# Patient Record
Sex: FEMALE | Race: WHITE | NOT HISPANIC OR LATINO | Employment: OTHER | URBAN - METROPOLITAN AREA
[De-identification: names, ages, dates, MRNs, and addresses within clinical notes are randomized per-mention and may not be internally consistent; named-entity substitution may affect disease eponyms.]

---

## 2019-09-12 ENCOUNTER — OFFICE VISIT (OUTPATIENT)
Dept: URGENT CARE | Age: 69
End: 2019-09-12
Payer: COMMERCIAL

## 2019-09-12 VITALS
HEIGHT: 68 IN | WEIGHT: 187 LBS | DIASTOLIC BLOOD PRESSURE: 74 MMHG | HEART RATE: 120 BPM | BODY MASS INDEX: 28.34 KG/M2 | RESPIRATION RATE: 18 BRPM | OXYGEN SATURATION: 93 % | TEMPERATURE: 98 F | SYSTOLIC BLOOD PRESSURE: 144 MMHG

## 2019-09-12 DIAGNOSIS — R10.13 EPIGASTRIC PAIN: ICD-10-CM

## 2019-09-12 DIAGNOSIS — R11.0 NAUSEA: ICD-10-CM

## 2019-09-12 DIAGNOSIS — R63.5 WEIGHT GAIN: ICD-10-CM

## 2019-09-12 DIAGNOSIS — M79.89 LEG SWELLING: ICD-10-CM

## 2019-09-12 DIAGNOSIS — R06.02 SHORTNESS OF BREATH: Primary | ICD-10-CM

## 2019-09-12 DIAGNOSIS — R07.89 ATYPICAL CHEST PAIN: ICD-10-CM

## 2019-09-12 LAB
ATRIAL RATE: 121 BPM
P AXIS: 73 DEGREES
PR INTERVAL: 160 MS
QRS AXIS: 87 DEGREES
QRSD INTERVAL: 102 MS
QT INTERVAL: 316 MS
QTC INTERVAL: 448 MS
T WAVE AXIS: -52 DEGREES
VENTRICULAR RATE: 121 BPM

## 2019-09-12 PROCEDURE — 93005 ELECTROCARDIOGRAM TRACING: CPT | Performed by: PHYSICIAN ASSISTANT

## 2019-09-12 PROCEDURE — G0383 LEV 4 HOSP TYPE B ED VISIT: HCPCS | Performed by: PHYSICIAN ASSISTANT

## 2019-09-12 PROCEDURE — 93010 ELECTROCARDIOGRAM REPORT: CPT | Performed by: INTERNAL MEDICINE

## 2019-09-12 RX ORDER — ASPIRIN 81 MG/1
324 TABLET, CHEWABLE ORAL DAILY
Status: DISCONTINUED | OUTPATIENT
Start: 2019-09-12 | End: 2019-09-12

## 2019-09-12 RX ORDER — ASPIRIN 81 MG/1
324 TABLET, CHEWABLE ORAL DAILY
Status: COMPLETED | OUTPATIENT
Start: 2019-09-12 | End: 2019-09-12

## 2019-09-12 RX ADMIN — ASPIRIN 324 MG: 81 TABLET, CHEWABLE ORAL at 08:06

## 2019-09-12 NOTE — PROGRESS NOTES
[address] [facility]        NAME: Star Riddle is a 77 y o  female  : 1950    MRN: [id number]  DATE: 2019  TIME: 8:08 AM    Assessment and Plan   Shortness of breath [R06 02]  1  Shortness of breath  ECG 12 lead    Transfer to other facility    CANCELED: Transfer to other facility   2  Epigastric pain  ECG 12 lead    aspirin chewable tablet 324 mg    Transfer to other facility    DISCONTINUED: aspirin chewable tablet 324 mg    CANCELED: Transfer to other facility   3  Nausea  ECG 12 lead    aspirin chewable tablet 324 mg    Transfer to other facility    DISCONTINUED: aspirin chewable tablet 324 mg    CANCELED: Transfer to other facility   4  Weight gain  ECG 12 lead    Transfer to other facility    CANCELED: Transfer to other facility   5  Leg swelling  ECG 12 lead    Transfer to other facility    CANCELED: Transfer to other facility   6  Atypical chest pain  Transfer to other facility     EKG- ST changes/abnormalities, tachycardia- will give ASA at this time due to atypical chest pain and EKG  Patient changed her mind when ambulance arrived and she would like to go to [facility]    Patient Instructions     Ambulance was called immediately, patient would like to go to [facility] ER    Chief Complaint     Chief Complaint   Patient presents with    Shortness of Breath     x 4 days, with intermittent dry cough, when laying c/o wheezing , a little edema to ankles, c/o weight gain of 7lbs x week   Abdominal Pain     epigastric discomfort since labor day, w/diarrhea, denies n/v         History of Present Illness       52-year-old female who denies any past medical history presents with shortness of breath, worse with lying down, weight gain up 5-7 lb in last week, bilateral leg swelling, nausea and epigastric pain worsening over the past 4 days  Patient states she was trying Nexium for the "acid reflux" feeling with no relief    She has not taken any aspirin or other medications for the symptoms  She notes since Labor Day she was having some diarrhea and generalized abdominal pain with decreased appetite and fluid intake  States the diarrhea and generalized abdominal pain has resolved but then the epigastric pain and the shortness of breath started  She denies chest pain or palpitations  No radiation of pain down her arms  Denies any prior abdominal surgeries  Review of Systems   Review of Systems   Constitutional: Positive for activity change, appetite change and fatigue  Negative for chills and fever  Eyes: Negative for visual disturbance  Respiratory: Positive for shortness of breath  Negative for cough and chest tightness  Cardiovascular: Positive for leg swelling  Negative for chest pain and palpitations  Gastrointestinal: Positive for abdominal pain and nausea  Negative for diarrhea and vomiting  Musculoskeletal: Negative for back pain  Neurological: Negative for dizziness, numbness and headaches  All other systems reviewed and are negative  Current Medications       Current Outpatient Medications:     esomeprazole (NexIUM) 20 mg capsule, Take 20 mg by mouth every morning before breakfast, Disp: , Rfl:   No current facility-administered medications for this visit  Current Allergies     Allergies as of 09/12/2019    (No Known Allergies)            The following portions of the patient's history were reviewed and updated as appropriate: allergies, current medications, past family history, past medical history, past social history, past surgical history and problem list      History reviewed  No pertinent past medical history  History reviewed  No pertinent surgical history  No family history on file  Medications have been verified          Objective   /74   Pulse (!) 120   Temp 98 °F (36 7 °C)   Resp 18   Ht 5' 8" (1 727 m)   Wt 84 8 kg (187 lb)   SpO2 93%   BMI 28 43 kg/m²        Physical Exam     Physical Exam Constitutional: She is oriented to person, place, and time  She appears well-developed and well-nourished  Cardiovascular: Regular rhythm  Tachycardia present  Pulmonary/Chest: Effort normal and breath sounds normal  She has no decreased breath sounds  She has no wheezes  Abdominal: Soft  Bowel sounds are normal  There is tenderness (epigastric)  Neurological: She is alert and oriented to person, place, and time  Psychiatric: She has a normal mood and affect  Her behavior is normal    Nursing note and vitals reviewed

## 2021-01-01 ENCOUNTER — APPOINTMENT (INPATIENT)
Dept: RADIOLOGY | Facility: HOSPITAL | Age: 71
DRG: 309 | End: 2021-01-01
Payer: COMMERCIAL

## 2021-01-01 ENCOUNTER — HOSPITAL ENCOUNTER (INPATIENT)
Facility: HOSPITAL | Age: 71
LOS: 4 days | Discharge: HOME | DRG: 309 | End: 2021-05-20
Attending: SURGERY | Admitting: ANESTHESIOLOGY
Payer: COMMERCIAL

## 2021-01-01 ENCOUNTER — APPOINTMENT (EMERGENCY)
Dept: RADIOLOGY | Facility: HOSPITAL | Age: 71
DRG: 309 | End: 2021-01-01
Payer: COMMERCIAL

## 2021-01-01 ENCOUNTER — APPOINTMENT (INPATIENT)
Dept: NON INVASIVE DIAGNOSTICS | Facility: HOSPITAL | Age: 71
DRG: 309 | End: 2021-01-01
Payer: COMMERCIAL

## 2021-01-01 VITALS
BODY MASS INDEX: 22.88 KG/M2 | HEIGHT: 67 IN | WEIGHT: 145.8 LBS | TEMPERATURE: 97.9 F | DIASTOLIC BLOOD PRESSURE: 53 MMHG | OXYGEN SATURATION: 97 % | HEART RATE: 57 BPM | SYSTOLIC BLOOD PRESSURE: 115 MMHG | RESPIRATION RATE: 19 BRPM

## 2021-01-01 DIAGNOSIS — I47.2 VENTRICULAR TACHYCARDIA (HCC): ICD-10-CM

## 2021-01-01 DIAGNOSIS — Z23 ENCOUNTER FOR IMMUNIZATION: ICD-10-CM

## 2021-01-01 DIAGNOSIS — R55 SYNCOPE: ICD-10-CM
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
## 2021-01-01 LAB
ALBUMIN SERPL BCP-MCNC: 2.8 G/DL (ref 3.5–5)
ALBUMIN SERPL BCP-MCNC: 2.9 G/DL (ref 3.5–5)
ALBUMIN SERPL BCP-MCNC: 3.3 G/DL (ref 3.5–5)
ALP SERPL-CCNC: 68 U/L (ref 46–116)
ALP SERPL-CCNC: 69 U/L (ref 46–116)
ALP SERPL-CCNC: 89 U/L (ref 46–116)
ALT SERPL W P-5'-P-CCNC: 108 U/L (ref 12–78)
ALT SERPL W P-5'-P-CCNC: 142 U/L (ref 12–78)
ALT SERPL W P-5'-P-CCNC: 87 U/L (ref 12–78)
ANION GAP SERPL CALCULATED.3IONS-SCNC: 10 MMOL/L (ref 4–13)
ANION GAP SERPL CALCULATED.3IONS-SCNC: 11 MMOL/L (ref 4–13)
ANION GAP SERPL CALCULATED.3IONS-SCNC: 14 MMOL/L (ref 4–13)
ANION GAP SERPL CALCULATED.3IONS-SCNC: 6 MMOL/L (ref 4–13)
ANION GAP SERPL CALCULATED.3IONS-SCNC: 7 MMOL/L (ref 4–13)
ANION GAP SERPL CALCULATED.3IONS-SCNC: 7 MMOL/L (ref 4–13)
ANION GAP SERPL CALCULATED.3IONS-SCNC: 9 MMOL/L (ref 4–13)
AST SERPL W P-5'-P-CCNC: 103 U/L (ref 5–45)
AST SERPL W P-5'-P-CCNC: 103 U/L (ref 5–45)
AST SERPL W P-5'-P-CCNC: 62 U/L (ref 5–45)
ATRIAL RATE: 49 BPM
ATRIAL RATE: 56 BPM
ATRIAL RATE: 65 BPM
ATRIAL RATE: 66 BPM
ATRIAL RATE: 80 BPM
ATRIAL RATE: 82 BPM
ATRIAL RATE: 98 BPM
BASE EX.OXY STD BLDV CALC-SCNC: 68.1 % (ref 60–80)
BASE EXCESS BLDA CALC-SCNC: -11.5 MMOL/L
BASE EXCESS BLDA CALC-SCNC: -3.3 MMOL/L
BASE EXCESS BLDA CALC-SCNC: -3.9 MMOL/L
BASE EXCESS BLDA CALC-SCNC: -4.2 MMOL/L
BASE EXCESS BLDA CALC-SCNC: -5 MMOL/L (ref -2–3)
BASE EXCESS BLDA CALC-SCNC: -9 MMOL/L (ref -2–3)
BASE EXCESS BLDV CALC-SCNC: -1.8 MMOL/L
BASOPHILS # BLD AUTO: 0.05 THOUSANDS/ΜL (ref 0–0.1)
BASOPHILS # BLD AUTO: 0.05 THOUSANDS/ΜL (ref 0–0.1)
BASOPHILS # BLD AUTO: 0.06 THOUSANDS/ΜL (ref 0–0.1)
BASOPHILS # BLD AUTO: 0.06 THOUSANDS/ΜL (ref 0–0.1)
BASOPHILS # BLD AUTO: 0.08 THOUSANDS/ΜL (ref 0–0.1)
BASOPHILS NFR BLD AUTO: 0 % (ref 0–1)
BASOPHILS NFR BLD AUTO: 1 % (ref 0–1)
BASOPHILS NFR BLD AUTO: 1 % (ref 0–1)
BILIRUB DIRECT SERPL-MCNC: 0.15 MG/DL (ref 0–0.2)
BILIRUB DIRECT SERPL-MCNC: 0.24 MG/DL (ref 0–0.2)
BILIRUB SERPL-MCNC: 0.54 MG/DL (ref 0.2–1)
BILIRUB SERPL-MCNC: 0.75 MG/DL (ref 0.2–1)
BILIRUB SERPL-MCNC: 1 MG/DL (ref 0.2–1)
BODY TEMPERATURE: 97.5 DEGREES FEHRENHEIT
BODY TEMPERATURE: 97.7 DEGREES FEHRENHEIT
BUN SERPL-MCNC: 20 MG/DL (ref 5–25)
BUN SERPL-MCNC: 24 MG/DL (ref 5–25)
BUN SERPL-MCNC: 27 MG/DL (ref 5–25)
BUN SERPL-MCNC: 37 MG/DL (ref 5–25)
BUN SERPL-MCNC: 38 MG/DL (ref 5–25)
BUN SERPL-MCNC: 38 MG/DL (ref 5–25)
BUN SERPL-MCNC: 42 MG/DL (ref 5–25)
CA-I BLD-SCNC: 1.18 MMOL/L (ref 1.12–1.32)
CA-I BLD-SCNC: 1.3 MMOL/L (ref 1.12–1.32)
CA-I BLD-SCNC: 1.45 MMOL/L (ref 1.12–1.32)
CALCIUM ALBUM COR SERPL-MCNC: 10.9 MG/DL (ref 8.3–10.1)
CALCIUM SERPL-MCNC: 10 MG/DL (ref 8.3–10.1)
CALCIUM SERPL-MCNC: 10.2 MG/DL (ref 8.3–10.1)
CALCIUM SERPL-MCNC: 10.3 MG/DL (ref 8.3–10.1)
CALCIUM SERPL-MCNC: 11.1 MG/DL (ref 8.3–10.1)
CALCIUM SERPL-MCNC: 9 MG/DL (ref 8.3–10.1)
CALCIUM SERPL-MCNC: 9.1 MG/DL (ref 8.3–10.1)
CALCIUM SERPL-MCNC: 9.8 MG/DL (ref 8.3–10.1)
CHLORIDE SERPL-SCNC: 104 MMOL/L (ref 100–108)
CHLORIDE SERPL-SCNC: 106 MMOL/L (ref 100–108)
CHLORIDE SERPL-SCNC: 107 MMOL/L (ref 100–108)
CHLORIDE SERPL-SCNC: 109 MMOL/L (ref 100–108)
CHLORIDE SERPL-SCNC: 110 MMOL/L (ref 100–108)
CHLORIDE SERPL-SCNC: 110 MMOL/L (ref 100–108)
CHLORIDE SERPL-SCNC: 111 MMOL/L (ref 100–108)
CO2 SERPL-SCNC: 17 MMOL/L (ref 21–32)
CO2 SERPL-SCNC: 19 MMOL/L (ref 21–32)
CO2 SERPL-SCNC: 20 MMOL/L (ref 21–32)
CO2 SERPL-SCNC: 20 MMOL/L (ref 21–32)
CO2 SERPL-SCNC: 21 MMOL/L (ref 21–32)
CO2 SERPL-SCNC: 21 MMOL/L (ref 21–32)
CO2 SERPL-SCNC: 24 MMOL/L (ref 21–32)
CREAT SERPL-MCNC: 1.05 MG/DL (ref 0.6–1.3)
CREAT SERPL-MCNC: 1.05 MG/DL (ref 0.6–1.3)
CREAT SERPL-MCNC: 1.11 MG/DL (ref 0.6–1.3)
CREAT SERPL-MCNC: 1.44 MG/DL (ref 0.6–1.3)
CREAT SERPL-MCNC: 1.48 MG/DL (ref 0.6–1.3)
CREAT SERPL-MCNC: 1.53 MG/DL (ref 0.6–1.3)
CREAT SERPL-MCNC: 1.83 MG/DL (ref 0.6–1.3)
EOSINOPHIL # BLD AUTO: 0 THOUSAND/ΜL (ref 0–0.61)
EOSINOPHIL # BLD AUTO: 0.05 THOUSAND/ΜL (ref 0–0.61)
EOSINOPHIL # BLD AUTO: 0.06 THOUSAND/ΜL (ref 0–0.61)
EOSINOPHIL # BLD AUTO: 0.15 THOUSAND/ΜL (ref 0–0.61)
EOSINOPHIL # BLD AUTO: 0.22 THOUSAND/ΜL (ref 0–0.61)
EOSINOPHIL NFR BLD AUTO: 0 % (ref 0–6)
EOSINOPHIL NFR BLD AUTO: 2 % (ref 0–6)
EOSINOPHIL NFR BLD AUTO: 2 % (ref 0–6)
ERYTHROCYTE [DISTWIDTH] IN BLOOD BY AUTOMATED COUNT: 14.6 % (ref 11.6–15.1)
ERYTHROCYTE [DISTWIDTH] IN BLOOD BY AUTOMATED COUNT: 14.6 % (ref 11.6–15.1)
ERYTHROCYTE [DISTWIDTH] IN BLOOD BY AUTOMATED COUNT: 14.9 % (ref 11.6–15.1)
ERYTHROCYTE [DISTWIDTH] IN BLOOD BY AUTOMATED COUNT: 15 % (ref 11.6–15.1)
EST. AVERAGE GLUCOSE BLD GHB EST-MCNC: 131 MG/DL
GFR SERPL CREATININE-BSD FRML MDRD: 28 ML/MIN/1.73SQ M
GFR SERPL CREATININE-BSD FRML MDRD: 34 ML/MIN/1.73SQ M
GFR SERPL CREATININE-BSD FRML MDRD: 36 ML/MIN/1.73SQ M
GFR SERPL CREATININE-BSD FRML MDRD: 37 ML/MIN/1.73SQ M
GFR SERPL CREATININE-BSD FRML MDRD: 50 ML/MIN/1.73SQ M
GFR SERPL CREATININE-BSD FRML MDRD: 54 ML/MIN/1.73SQ M
GFR SERPL CREATININE-BSD FRML MDRD: 54 ML/MIN/1.73SQ M
GLUCOSE SERPL-MCNC: 105 MG/DL (ref 65–140)
GLUCOSE SERPL-MCNC: 120 MG/DL (ref 65–140)
GLUCOSE SERPL-MCNC: 122 MG/DL (ref 65–140)
GLUCOSE SERPL-MCNC: 124 MG/DL (ref 65–140)
GLUCOSE SERPL-MCNC: 125 MG/DL (ref 65–140)
GLUCOSE SERPL-MCNC: 128 MG/DL (ref 65–140)
GLUCOSE SERPL-MCNC: 130 MG/DL (ref 65–140)
GLUCOSE SERPL-MCNC: 132 MG/DL (ref 65–140)
GLUCOSE SERPL-MCNC: 133 MG/DL (ref 65–140)
GLUCOSE SERPL-MCNC: 137 MG/DL (ref 65–140)
GLUCOSE SERPL-MCNC: 137 MG/DL (ref 65–140)
GLUCOSE SERPL-MCNC: 143 MG/DL (ref 65–140)
GLUCOSE SERPL-MCNC: 146 MG/DL (ref 65–140)
GLUCOSE SERPL-MCNC: 148 MG/DL (ref 65–140)
GLUCOSE SERPL-MCNC: 149 MG/DL (ref 65–140)
GLUCOSE SERPL-MCNC: 151 MG/DL (ref 65–140)
GLUCOSE SERPL-MCNC: 153 MG/DL (ref 65–140)
GLUCOSE SERPL-MCNC: 158 MG/DL (ref 65–140)
GLUCOSE SERPL-MCNC: 168 MG/DL (ref 65–140)
GLUCOSE SERPL-MCNC: 171 MG/DL (ref 65–140)
GLUCOSE SERPL-MCNC: 178 MG/DL (ref 65–140)
GLUCOSE SERPL-MCNC: 186 MG/DL (ref 65–140)
GLUCOSE SERPL-MCNC: 192 MG/DL (ref 65–140)
GLUCOSE SERPL-MCNC: 215 MG/DL (ref 65–140)
GLUCOSE SERPL-MCNC: 295 MG/DL (ref 65–140)
HBA1C MFR BLD: 6.2 %
HCO3 BLDA-SCNC: 12.1 MMOL/L (ref 22–28)
HCO3 BLDA-SCNC: 17.6 MMOL/L (ref 22–28)
HCO3 BLDA-SCNC: 17.9 MMOL/L (ref 24–30)
HCO3 BLDA-SCNC: 18.1 MMOL/L (ref 22–28)
HCO3 BLDA-SCNC: 18.3 MMOL/L (ref 22–28)
HCO3 BLDA-SCNC: 18.4 MMOL/L (ref 24–30)
HCO3 BLDV-SCNC: 21.7 MMOL/L (ref 24–30)
HCT VFR BLD AUTO: 33.9 % (ref 34.8–46.1)
HCT VFR BLD AUTO: 34.6 % (ref 34.8–46.1)
HCT VFR BLD AUTO: 37.3 % (ref 34.8–46.1)
HCT VFR BLD AUTO: 38.2 % (ref 34.8–46.1)
HCT VFR BLD AUTO: 39.4 % (ref 34.8–46.1)
HCT VFR BLD AUTO: 42.6 % (ref 34.8–46.1)
HCT VFR BLD CALC: 38 % (ref 34.8–46.1)
HCT VFR BLD CALC: 44 % (ref 34.8–46.1)
HGB BLD-MCNC: 10.7 G/DL (ref 11.5–15.4)
HGB BLD-MCNC: 10.9 G/DL (ref 11.5–15.4)
HGB BLD-MCNC: 11.9 G/DL (ref 11.5–15.4)
HGB BLD-MCNC: 12.1 G/DL (ref 11.5–15.4)
HGB BLD-MCNC: 12.5 G/DL (ref 11.5–15.4)
HGB BLD-MCNC: 13.6 G/DL (ref 11.5–15.4)
HGB BLDA-MCNC: 12.9 G/DL (ref 11.5–15.4)
HGB BLDA-MCNC: 15 G/DL (ref 11.5–15.4)
HOLD SPECIMEN: NORMAL
HOROWITZ INDEX BLDA+IHG-RTO: 50 MM[HG]
HOROWITZ INDEX BLDA+IHG-RTO: 80 MM[HG]
IMM GRANULOCYTES # BLD AUTO: 0.03 THOUSAND/UL (ref 0–0.2)
IMM GRANULOCYTES # BLD AUTO: 0.03 THOUSAND/UL (ref 0–0.2)
IMM GRANULOCYTES # BLD AUTO: 0.04 THOUSAND/UL (ref 0–0.2)
IMM GRANULOCYTES # BLD AUTO: 0.06 THOUSAND/UL (ref 0–0.2)
IMM GRANULOCYTES # BLD AUTO: 0.08 THOUSAND/UL (ref 0–0.2)
IMM GRANULOCYTES NFR BLD AUTO: 0 % (ref 0–2)
IMM GRANULOCYTES NFR BLD AUTO: 1 % (ref 0–2)
INR PPP: 1.06 (ref 0.84–1.19)
LACTATE SERPL-SCNC: 2 MMOL/L (ref 0.5–2)
LACTATE SERPL-SCNC: 2.2 MMOL/L (ref 0.5–2)
LIDOCAIN SERPL-MCNC: 3.5 UG/ML (ref 1.5–5)
LYMPHOCYTES # BLD AUTO: 1.15 THOUSANDS/ΜL (ref 0.6–4.47)
LYMPHOCYTES # BLD AUTO: 1.7 THOUSANDS/ΜL (ref 0.6–4.47)
LYMPHOCYTES # BLD AUTO: 1.74 THOUSANDS/ΜL (ref 0.6–4.47)
LYMPHOCYTES # BLD AUTO: 2.17 THOUSANDS/ΜL (ref 0.6–4.47)
LYMPHOCYTES # BLD AUTO: 4.84 THOUSANDS/ΜL (ref 0.6–4.47)
LYMPHOCYTES NFR BLD AUTO: 13 % (ref 14–44)
LYMPHOCYTES NFR BLD AUTO: 14 % (ref 14–44)
LYMPHOCYTES NFR BLD AUTO: 19 % (ref 14–44)
LYMPHOCYTES NFR BLD AUTO: 34 % (ref 14–44)
LYMPHOCYTES NFR BLD AUTO: 8 % (ref 14–44)
MAGNESIUM SERPL-MCNC: 1.7 MG/DL (ref 1.6–2.6)
MAGNESIUM SERPL-MCNC: 2.4 MG/DL (ref 1.6–2.6)
MAGNESIUM SERPL-MCNC: 2.6 MG/DL (ref 1.6–2.6)
MAGNESIUM SERPL-MCNC: 2.8 MG/DL (ref 1.6–2.6)
MAGNESIUM SERPL-MCNC: 4.1 MG/DL (ref 1.6–2.6)
MAGNESIUM SERPL-MCNC: 5.4 MG/DL (ref 1.6–2.6)
MCH RBC QN AUTO: 26.6 PG (ref 26.8–34.3)
MCH RBC QN AUTO: 27 PG (ref 26.8–34.3)
MCH RBC QN AUTO: 27.1 PG (ref 26.8–34.3)
MCH RBC QN AUTO: 27.2 PG (ref 26.8–34.3)
MCHC RBC AUTO-ENTMCNC: 30.9 G/DL (ref 31.4–37.4)
MCHC RBC AUTO-ENTMCNC: 31.2 G/DL (ref 31.4–37.4)
MCHC RBC AUTO-ENTMCNC: 31.7 G/DL (ref 31.4–37.4)
MCHC RBC AUTO-ENTMCNC: 31.9 G/DL (ref 31.4–37.4)
MCHC RBC AUTO-ENTMCNC: 32.2 G/DL (ref 31.4–37.4)
MCHC RBC AUTO-ENTMCNC: 32.4 G/DL (ref 31.4–37.4)
MCV RBC AUTO: 83 FL (ref 82–98)
MCV RBC AUTO: 84 FL (ref 82–98)
MCV RBC AUTO: 85 FL (ref 82–98)
MCV RBC AUTO: 86 FL (ref 82–98)
MCV RBC AUTO: 86 FL (ref 82–98)
MCV RBC AUTO: 87 FL (ref 82–98)
MONOCYTES # BLD AUTO: 0.55 THOUSAND/ΜL (ref 0.17–1.22)
MONOCYTES # BLD AUTO: 0.73 THOUSAND/ΜL (ref 0.17–1.22)
MONOCYTES # BLD AUTO: 0.78 THOUSAND/ΜL (ref 0.17–1.22)
MONOCYTES # BLD AUTO: 0.86 THOUSAND/ΜL (ref 0.17–1.22)
MONOCYTES # BLD AUTO: 0.93 THOUSAND/ΜL (ref 0.17–1.22)
MONOCYTES NFR BLD AUTO: 5 % (ref 4–12)
MONOCYTES NFR BLD AUTO: 6 % (ref 4–12)
NEUTROPHILS # BLD AUTO: 10.84 THOUSANDS/ΜL (ref 1.85–7.62)
NEUTROPHILS # BLD AUTO: 12.92 THOUSANDS/ΜL (ref 1.85–7.62)
NEUTROPHILS # BLD AUTO: 12.93 THOUSANDS/ΜL (ref 1.85–7.62)
NEUTROPHILS # BLD AUTO: 6.72 THOUSANDS/ΜL (ref 1.85–7.62)
NEUTROPHILS # BLD AUTO: 8.39 THOUSANDS/ΜL (ref 1.85–7.62)
NEUTS SEG NFR BLD AUTO: 58 % (ref 43–75)
NEUTS SEG NFR BLD AUTO: 72 % (ref 43–75)
NEUTS SEG NFR BLD AUTO: 80 % (ref 43–75)
NEUTS SEG NFR BLD AUTO: 81 % (ref 43–75)
NEUTS SEG NFR BLD AUTO: 85 % (ref 43–75)
NRBC BLD AUTO-RTO: 0 /100 WBCS
O2 CT BLDA-SCNC: 18.1 ML/DL (ref 16–23)
O2 CT BLDA-SCNC: 18.1 ML/DL (ref 16–23)
O2 CT BLDA-SCNC: 18.6 ML/DL (ref 16–23)
O2 CT BLDA-SCNC: 18.8 ML/DL (ref 16–23)
O2 CT BLDV-SCNC: 12.2 ML/DL
OXYHGB MFR BLDA: 98.2 % (ref 94–97)
OXYHGB MFR BLDA: 98.3 % (ref 94–97)
OXYHGB MFR BLDA: 98.4 % (ref 94–97)
OXYHGB MFR BLDA: 98.7 % (ref 94–97)
P AXIS: 60 DEGREES
P AXIS: 62 DEGREES
P AXIS: 64 DEGREES
P AXIS: 75 DEGREES
P AXIS: 80 DEGREES
P AXIS: 80 DEGREES
P AXIS: 81 DEGREES
PCO2 BLD: 19 MMOL/L (ref 21–32)
PCO2 BLD: 19 MMOL/L (ref 21–32)
PCO2 BLD: 29.2 MM HG (ref 42–50)
PCO2 BLD: 42.1 MM HG (ref 42–50)
PCO2 BLDA: 22.6 MM HG (ref 36–44)
PCO2 BLDA: 23.5 MM HG (ref 36–44)
PCO2 BLDA: 24.4 MM HG (ref 36–44)
PCO2 BLDA: 25.8 MM HG (ref 36–44)
PCO2 BLDV: 33.2 MM HG (ref 42–50)
PEEP RESPIRATORY: 6 CM[H2O]
PH BLD: 7.24 [PH] (ref 7.3–7.4)
PH BLD: 7.41 [PH] (ref 7.3–7.4)
PH BLDA: 7.35 [PH] (ref 7.35–7.45)
PH BLDA: 7.47 [PH] (ref 7.35–7.45)
PH BLDA: 7.48 [PH] (ref 7.35–7.45)
PH BLDA: 7.5 [PH] (ref 7.35–7.45)
PH BLDV: 7.43 [PH] (ref 7.3–7.4)
PHOSPHATE SERPL-MCNC: 3.3 MG/DL (ref 2.3–4.1)
PHOSPHATE SERPL-MCNC: 3.6 MG/DL (ref 2.3–4.1)
PHOSPHATE SERPL-MCNC: 3.7 MG/DL (ref 2.3–4.1)
PHOSPHATE SERPL-MCNC: 4.1 MG/DL (ref 2.3–4.1)
PHOSPHATE SERPL-MCNC: 4.2 MG/DL (ref 2.3–4.1)
PLATELET # BLD AUTO: 211 THOUSANDS/UL (ref 149–390)
PLATELET # BLD AUTO: 214 THOUSANDS/UL (ref 149–390)
PLATELET # BLD AUTO: 223 THOUSANDS/UL (ref 149–390)
PLATELET # BLD AUTO: 274 THOUSANDS/UL (ref 149–390)
PLATELET # BLD AUTO: 303 THOUSANDS/UL (ref 149–390)
PLATELET # BLD AUTO: 322 THOUSANDS/UL (ref 149–390)
PMV BLD AUTO: 11.2 FL (ref 8.9–12.7)
PMV BLD AUTO: 11.2 FL (ref 8.9–12.7)
PMV BLD AUTO: 11.4 FL (ref 8.9–12.7)
PMV BLD AUTO: 11.5 FL (ref 8.9–12.7)
PMV BLD AUTO: 11.9 FL (ref 8.9–12.7)
PMV BLD AUTO: 11.9 FL (ref 8.9–12.7)
PO2 BLD: 37 MM HG (ref 35–45)
PO2 BLD: 65 MM HG (ref 35–45)
PO2 BLDA: 215.2 MM HG (ref 75–129)
PO2 BLDA: 228.4 MM HG (ref 75–129)
PO2 BLDA: 276.7 MM HG (ref 75–129)
PO2 BLDA: 322 MM HG (ref 75–129)
PO2 BLDV: 34.5 MM HG (ref 35–45)
POTASSIUM BLD-SCNC: 4.8 MMOL/L (ref 3.5–5.3)
POTASSIUM BLD-SCNC: 5.3 MMOL/L (ref 3.5–5.3)
POTASSIUM SERPL-SCNC: 3.6 MMOL/L (ref 3.5–5.3)
POTASSIUM SERPL-SCNC: 4 MMOL/L (ref 3.5–5.3)
POTASSIUM SERPL-SCNC: 4.3 MMOL/L (ref 3.5–5.3)
POTASSIUM SERPL-SCNC: 4.5 MMOL/L (ref 3.5–5.3)
POTASSIUM SERPL-SCNC: 5 MMOL/L (ref 3.5–5.3)
POTASSIUM SERPL-SCNC: 5.2 MMOL/L (ref 3.5–5.3)
POTASSIUM SERPL-SCNC: 5.6 MMOL/L (ref 3.5–5.3)
PR INTERVAL: 200 MS
PR INTERVAL: 262 MS
PR INTERVAL: 263 MS
PR INTERVAL: 275 MS
PR INTERVAL: 279 MS
PR INTERVAL: 283 MS
PR INTERVAL: 283 MS
PROT SERPL-MCNC: 6 G/DL (ref 6.4–8.2)
PROT SERPL-MCNC: 6.3 G/DL (ref 6.4–8.2)
PROT SERPL-MCNC: 6.4 G/DL (ref 6.4–8.2)
PROTHROMBIN TIME: 13.8 SECONDS (ref 11.6–14.5)
QRS AXIS: 42 DEGREES
QRS AXIS: 43 DEGREES
QRS AXIS: 43 DEGREES
QRS AXIS: 44 DEGREES
QRS AXIS: 50 DEGREES
QRS AXIS: 57 DEGREES
QRS AXIS: 58 DEGREES
QRSD INTERVAL: 100 MS
QRSD INTERVAL: 104 MS
QRSD INTERVAL: 108 MS
QRSD INTERVAL: 129 MS
QRSD INTERVAL: 90 MS
QRSD INTERVAL: 96 MS
QRSD INTERVAL: 96 MS
QT INTERVAL: 386 MS
QT INTERVAL: 392 MS
QT INTERVAL: 392 MS
QT INTERVAL: 402 MS
QT INTERVAL: 433 MS
QT INTERVAL: 475 MS
QT INTERVAL: 529 MS
QTC INTERVAL: 408 MS
QTC INTERVAL: 411 MS
QTC INTERVAL: 429 MS
QTC INTERVAL: 469 MS
QTC INTERVAL: 492 MS
QTC INTERVAL: 500 MS
QTC INTERVAL: 511 MS
RBC # BLD AUTO: 4.03 MILLION/UL (ref 3.81–5.12)
RBC # BLD AUTO: 4.04 MILLION/UL (ref 3.81–5.12)
RBC # BLD AUTO: 4.38 MILLION/UL (ref 3.81–5.12)
RBC # BLD AUTO: 4.47 MILLION/UL (ref 3.81–5.12)
RBC # BLD AUTO: 4.59 MILLION/UL (ref 3.81–5.12)
RBC # BLD AUTO: 5 MILLION/UL (ref 3.81–5.12)
SAO2 % BLD FROM PO2: 73 % (ref 60–85)
SAO2 % BLD FROM PO2: 88 % (ref 60–85)
SODIUM BLD-SCNC: 136 MMOL/L (ref 136–145)
SODIUM BLD-SCNC: 138 MMOL/L (ref 136–145)
SODIUM SERPL-SCNC: 133 MMOL/L (ref 136–145)
SODIUM SERPL-SCNC: 137 MMOL/L (ref 136–145)
SODIUM SERPL-SCNC: 138 MMOL/L (ref 136–145)
SODIUM SERPL-SCNC: 140 MMOL/L (ref 136–145)
SODIUM SERPL-SCNC: 141 MMOL/L (ref 136–145)
SPECIMEN SOURCE: ABNORMAL
T WAVE AXIS: 101 DEGREES
T WAVE AXIS: 102 DEGREES
T WAVE AXIS: 104 DEGREES
T WAVE AXIS: 118 DEGREES
T WAVE AXIS: 88 DEGREES
T WAVE AXIS: 88 DEGREES
T WAVE AXIS: 93 DEGREES
TROPONIN I SERPL-MCNC: 0.1 NG/ML
TROPONIN I SERPL-MCNC: 0.17 NG/ML
TROPONIN I SERPL-MCNC: 0.76 NG/ML
TROPONIN I SERPL-MCNC: 0.79 NG/ML
TROPONIN I SERPL-MCNC: 0.83 NG/ML
TROPONIN I SERPL-MCNC: <0.02 NG/ML
VENT AC: 20
VENT AC: 24
VENT- AC: AC
VENTRICULAR RATE: 49 BPM
VENTRICULAR RATE: 56 BPM
VENTRICULAR RATE: 65 BPM
VENTRICULAR RATE: 66 BPM
VENTRICULAR RATE: 80 BPM
VENTRICULAR RATE: 82 BPM
VENTRICULAR RATE: 98 BPM
VT SETTING VENT: 450 ML
VT SETTING VENT: 450 ML
VT SETTING VENT: 480 ML
VT SETTING VENT: 480 ML
WBC # BLD AUTO: 13.52 THOUSAND/UL (ref 4.31–10.16)
WBC # BLD AUTO: 14.29 THOUSAND/UL (ref 4.31–10.16)
WBC # BLD AUTO: 15.13 THOUSAND/UL (ref 4.31–10.16)
WBC # BLD AUTO: 16.13 THOUSAND/UL (ref 4.31–10.16)
WBC # BLD AUTO: 17.64 THOUSAND/UL (ref 4.31–10.16)
WBC # BLD AUTO: 9.2 THOUSAND/UL (ref 4.31–10.16)

## 2021-01-01 PROCEDURE — 84295 ASSAY OF SERUM SODIUM: CPT

## 2021-01-01 PROCEDURE — 83036 HEMOGLOBIN GLYCOSYLATED A1C: CPT | Performed by: FAMILY MEDICINE

## 2021-01-01 PROCEDURE — 85025 COMPLETE CBC W/AUTO DIFF WBC: CPT | Performed by: PHYSICIAN ASSISTANT

## 2021-01-01 PROCEDURE — 80053 COMPREHEN METABOLIC PANEL: CPT | Performed by: PHYSICIAN ASSISTANT

## 2021-01-01 PROCEDURE — 94003 VENT MGMT INPAT SUBQ DAY: CPT

## 2021-01-01 PROCEDURE — 99204 OFFICE O/P NEW MOD 45 MIN: CPT | Performed by: SURGERY

## 2021-01-01 PROCEDURE — 94760 N-INVAS EAR/PLS OXIMETRY 1: CPT

## 2021-01-01 PROCEDURE — 84100 ASSAY OF PHOSPHORUS: CPT | Performed by: EMERGENCY MEDICINE

## 2021-01-01 PROCEDURE — 83605 ASSAY OF LACTIC ACID: CPT | Performed by: PHYSICIAN ASSISTANT

## 2021-01-01 PROCEDURE — 83735 ASSAY OF MAGNESIUM: CPT | Performed by: PHYSICIAN ASSISTANT

## 2021-01-01 PROCEDURE — 70450 CT HEAD/BRAIN W/O DYE: CPT

## 2021-01-01 PROCEDURE — 93010 ELECTROCARDIOGRAM REPORT: CPT | Performed by: INTERNAL MEDICINE

## 2021-01-01 PROCEDURE — 96365 THER/PROPH/DIAG IV INF INIT: CPT

## 2021-01-01 PROCEDURE — 82947 ASSAY GLUCOSE BLOOD QUANT: CPT

## 2021-01-01 PROCEDURE — 80048 BASIC METABOLIC PNL TOTAL CA: CPT | Performed by: PHYSICIAN ASSISTANT

## 2021-01-01 PROCEDURE — 71045 X-RAY EXAM CHEST 1 VIEW: CPT

## 2021-01-01 PROCEDURE — 80076 HEPATIC FUNCTION PANEL: CPT | Performed by: PHYSICIAN ASSISTANT

## 2021-01-01 PROCEDURE — 94002 VENT MGMT INPAT INIT DAY: CPT

## 2021-01-01 PROCEDURE — G1004 CDSM NDSC: HCPCS

## 2021-01-01 PROCEDURE — 93005 ELECTROCARDIOGRAM TRACING: CPT

## 2021-01-01 PROCEDURE — 85025 COMPLETE CBC W/AUTO DIFF WBC: CPT | Performed by: SURGERY

## 2021-01-01 PROCEDURE — NC001 PR NO CHARGE: Performed by: SURGERY

## 2021-01-01 PROCEDURE — 80048 BASIC METABOLIC PNL TOTAL CA: CPT | Performed by: SURGERY

## 2021-01-01 PROCEDURE — 82330 ASSAY OF CALCIUM: CPT | Performed by: PHYSICIAN ASSISTANT

## 2021-01-01 PROCEDURE — 82805 BLOOD GASES W/O2 SATURATION: CPT | Performed by: PHYSICIAN ASSISTANT

## 2021-01-01 PROCEDURE — 85014 HEMATOCRIT: CPT

## 2021-01-01 PROCEDURE — 4A133J1 MONITORING OF ARTERIAL PULSE, PERIPHERAL, PERCUTANEOUS APPROACH: ICD-10-PCS | Performed by: EMERGENCY MEDICINE

## 2021-01-01 PROCEDURE — 80048 BASIC METABOLIC PNL TOTAL CA: CPT | Performed by: EMERGENCY MEDICINE

## 2021-01-01 PROCEDURE — 93308 TTE F-UP OR LMTD: CPT | Performed by: SURGERY

## 2021-01-01 PROCEDURE — 84132 ASSAY OF SERUM POTASSIUM: CPT

## 2021-01-01 PROCEDURE — 76705 ECHO EXAM OF ABDOMEN: CPT | Performed by: SURGERY

## 2021-01-01 PROCEDURE — 0BH17EZ INSERTION OF ENDOTRACHEAL AIRWAY INTO TRACHEA, VIA NATURAL OR ARTIFICIAL OPENING: ICD-10-PCS | Performed by: EMERGENCY MEDICINE

## 2021-01-01 PROCEDURE — 82948 REAGENT STRIP/BLOOD GLUCOSE: CPT

## 2021-01-01 PROCEDURE — 99232 SBSQ HOSP IP/OBS MODERATE 35: CPT | Performed by: INTERNAL MEDICINE

## 2021-01-01 PROCEDURE — 99223 1ST HOSP IP/OBS HIGH 75: CPT | Performed by: INTERNAL MEDICINE

## 2021-01-01 PROCEDURE — 4A133B1 MONITORING OF ARTERIAL PRESSURE, PERIPHERAL, PERCUTANEOUS APPROACH: ICD-10-PCS | Performed by: EMERGENCY MEDICINE

## 2021-01-01 PROCEDURE — C9113 INJ PANTOPRAZOLE SODIUM, VIA: HCPCS | Performed by: PHYSICIAN ASSISTANT

## 2021-01-01 PROCEDURE — 85610 PROTHROMBIN TIME: CPT | Performed by: PHYSICIAN ASSISTANT

## 2021-01-01 PROCEDURE — 82803 BLOOD GASES ANY COMBINATION: CPT

## 2021-01-01 PROCEDURE — 02HV33Z INSERTION OF INFUSION DEVICE INTO SUPERIOR VENA CAVA, PERCUTANEOUS APPROACH: ICD-10-PCS | Performed by: EMERGENCY MEDICINE

## 2021-01-01 PROCEDURE — 84100 ASSAY OF PHOSPHORUS: CPT | Performed by: PHYSICIAN ASSISTANT

## 2021-01-01 PROCEDURE — 93306 TTE W/DOPPLER COMPLETE: CPT

## 2021-01-01 PROCEDURE — 0HQ0XZZ REPAIR SCALP SKIN, EXTERNAL APPROACH: ICD-10-PCS | Performed by: SURGERY

## 2021-01-01 PROCEDURE — 99222 1ST HOSP IP/OBS MODERATE 55: CPT | Performed by: INTERNAL MEDICINE

## 2021-01-01 PROCEDURE — 84484 ASSAY OF TROPONIN QUANT: CPT | Performed by: PHYSICIAN ASSISTANT

## 2021-01-01 PROCEDURE — 99233 SBSQ HOSP IP/OBS HIGH 50: CPT | Performed by: ANESTHESIOLOGY

## 2021-01-01 PROCEDURE — 93306 TTE W/DOPPLER COMPLETE: CPT | Performed by: INTERNAL MEDICINE

## 2021-01-01 PROCEDURE — 5A1935Z RESPIRATORY VENTILATION, LESS THAN 24 CONSECUTIVE HOURS: ICD-10-PCS | Performed by: FAMILY MEDICINE

## 2021-01-01 PROCEDURE — 84484 ASSAY OF TROPONIN QUANT: CPT | Performed by: SURGERY

## 2021-01-01 PROCEDURE — 03HY32Z INSERTION OF MONITORING DEVICE INTO UPPER ARTERY, PERCUTANEOUS APPROACH: ICD-10-PCS | Performed by: EMERGENCY MEDICINE

## 2021-01-01 PROCEDURE — 97163 PT EVAL HIGH COMPLEX 45 MIN: CPT

## 2021-01-01 PROCEDURE — NC001 PR NO CHARGE: Performed by: EMERGENCY MEDICINE

## 2021-01-01 PROCEDURE — 85027 COMPLETE CBC AUTOMATED: CPT | Performed by: PHYSICIAN ASSISTANT

## 2021-01-01 PROCEDURE — 84484 ASSAY OF TROPONIN QUANT: CPT | Performed by: EMERGENCY MEDICINE

## 2021-01-01 PROCEDURE — 72125 CT NECK SPINE W/O DYE: CPT

## 2021-01-01 PROCEDURE — 12002 RPR S/N/AX/GEN/TRNK2.6-7.5CM: CPT | Performed by: SURGERY

## 2021-01-01 PROCEDURE — 99291 CRITICAL CARE FIRST HOUR: CPT | Performed by: PHYSICIAN ASSISTANT

## 2021-01-01 PROCEDURE — 99239 HOSP IP/OBS DSCHRG MGMT >30: CPT | Performed by: FAMILY MEDICINE

## 2021-01-01 PROCEDURE — 5A2204Z RESTORATION OF CARDIAC RHYTHM, SINGLE: ICD-10-PCS | Performed by: INTERNAL MEDICINE

## 2021-01-01 PROCEDURE — 92960 CARDIOVERSION ELECTRIC EXT: CPT

## 2021-01-01 PROCEDURE — 99285 EMERGENCY DEPT VISIT HI MDM: CPT

## 2021-01-01 PROCEDURE — 80176 ASSAY OF LIDOCAINE: CPT | Performed by: PHYSICIAN ASSISTANT

## 2021-01-01 PROCEDURE — 99291 CRITICAL CARE FIRST HOUR: CPT | Performed by: ANESTHESIOLOGY

## 2021-01-01 PROCEDURE — 99292 CRITICAL CARE ADDL 30 MIN: CPT | Performed by: ANESTHESIOLOGY

## 2021-01-01 PROCEDURE — 36415 COLL VENOUS BLD VENIPUNCTURE: CPT | Performed by: SURGERY

## 2021-01-01 PROCEDURE — 83735 ASSAY OF MAGNESIUM: CPT | Performed by: EMERGENCY MEDICINE

## 2021-01-01 RX ORDER — ATORVASTATIN CALCIUM 80 MG/1
80 TABLET, FILM COATED ORAL
Status: DISCONTINUED | OUTPATIENT
Start: 2021-01-01 | End: 2021-01-01 | Stop reason: HOSPADM

## 2021-01-01 RX ORDER — ALBUTEROL SULFATE 2.5 MG/3ML
2.5 SOLUTION RESPIRATORY (INHALATION) EVERY 4 HOURS PRN
Status: DISCONTINUED | OUTPATIENT
Start: 2021-01-01 | End: 2021-01-01

## 2021-01-01 RX ORDER — CHLORHEXIDINE GLUCONATE 0.12 MG/ML
15 RINSE ORAL EVERY 12 HOURS SCHEDULED
Status: DISCONTINUED | OUTPATIENT
Start: 2021-01-01 | End: 2021-01-01

## 2021-01-01 RX ORDER — MIDAZOLAM HYDROCHLORIDE 2 MG/2ML
INJECTION, SOLUTION INTRAMUSCULAR; INTRAVENOUS
Status: COMPLETED
Start: 2021-01-01 | End: 2021-01-01

## 2021-01-01 RX ORDER — AMIODARONE HYDROCHLORIDE 200 MG/1
200 TABLET ORAL 2 TIMES DAILY
Status: DISCONTINUED | OUTPATIENT
Start: 2021-01-01 | End: 2021-01-01 | Stop reason: HOSPADM

## 2021-01-01 RX ORDER — FENTANYL CITRATE-0.9 % NACL/PF 10 MCG/ML
50 PLASTIC BAG, INJECTION (ML) INTRAVENOUS CONTINUOUS
Status: DISCONTINUED | OUTPATIENT
Start: 2021-01-01 | End: 2021-01-01

## 2021-01-01 RX ORDER — METOCLOPRAMIDE HYDROCHLORIDE 5 MG/ML
10 INJECTION INTRAMUSCULAR; INTRAVENOUS EVERY 6 HOURS PRN
Status: DISCONTINUED | OUTPATIENT
Start: 2021-01-01 | End: 2021-01-01 | Stop reason: HOSPADM

## 2021-01-01 RX ORDER — NOREPINEPHRINE BITARTRATE 1 MG/ML
INJECTION, SOLUTION INTRAVENOUS
Status: DISPENSED
Start: 2021-01-01 | End: 2021-01-01

## 2021-01-01 RX ORDER — ONDANSETRON 2 MG/ML
1 INJECTION INTRAMUSCULAR; INTRAVENOUS ONCE
Status: COMPLETED | OUTPATIENT
Start: 2021-01-01 | End: 2021-01-01

## 2021-01-01 RX ORDER — AMIODARONE HYDROCHLORIDE 200 MG/1
200 TABLET ORAL 2 TIMES DAILY
Qty: 60 TABLET | Refills: 0 | Status: SHIPPED | OUTPATIENT
Start: 2021-01-01

## 2021-01-01 RX ORDER — METOPROLOL SUCCINATE 25 MG/1
25 TABLET, EXTENDED RELEASE ORAL DAILY
COMMUNITY
End: 2021-01-01 | Stop reason: HOSPADM

## 2021-01-01 RX ORDER — PANTOPRAZOLE SODIUM 40 MG/1
40 INJECTION, POWDER, FOR SOLUTION INTRAVENOUS
Status: DISCONTINUED | OUTPATIENT
Start: 2021-01-01 | End: 2021-01-01

## 2021-01-01 RX ORDER — MEXILETINE HYDROCHLORIDE 150 MG/1
150 CAPSULE ORAL EVERY 8 HOURS SCHEDULED
Status: DISCONTINUED | OUTPATIENT
Start: 2021-01-01 | End: 2021-01-01

## 2021-01-01 RX ORDER — SACUBITRIL AND VALSARTAN 24; 26 MG/1; MG/1
1 TABLET, FILM COATED ORAL 2 TIMES DAILY
COMMUNITY

## 2021-01-01 RX ORDER — EZETIMIBE 10 MG/1
10 TABLET ORAL DAILY
Status: DISCONTINUED | OUTPATIENT
Start: 2021-01-01 | End: 2021-01-01 | Stop reason: HOSPADM

## 2021-01-01 RX ORDER — ONDANSETRON 2 MG/ML
4 INJECTION INTRAMUSCULAR; INTRAVENOUS ONCE
Status: COMPLETED | OUTPATIENT
Start: 2021-01-01 | End: 2021-01-01

## 2021-01-01 RX ORDER — PROPOFOL 10 MG/ML
5-50 INJECTION, EMULSION INTRAVENOUS
Status: DISCONTINUED | OUTPATIENT
Start: 2021-01-01 | End: 2021-01-01

## 2021-01-01 RX ORDER — HEPARIN SODIUM 5000 [USP'U]/ML
5000 INJECTION, SOLUTION INTRAVENOUS; SUBCUTANEOUS EVERY 8 HOURS SCHEDULED
Status: DISCONTINUED | OUTPATIENT
Start: 2021-01-01 | End: 2021-01-01 | Stop reason: HOSPADM

## 2021-01-01 RX ORDER — ESMOLOL HYDROCHLORIDE 10 MG/ML
25-200 INJECTION, SOLUTION INTRAVENOUS ONCE
Status: DISCONTINUED | OUTPATIENT
Start: 2021-01-01 | End: 2021-01-01

## 2021-01-01 RX ORDER — ASPIRIN 81 MG/1
81 TABLET, CHEWABLE ORAL DAILY
COMMUNITY

## 2021-01-01 RX ORDER — FUROSEMIDE 10 MG/ML
20 INJECTION INTRAMUSCULAR; INTRAVENOUS ONCE
Status: COMPLETED | OUTPATIENT
Start: 2021-01-01 | End: 2021-01-01

## 2021-01-01 RX ORDER — DEXTROSE MONOHYDRATE 25 G/50ML
INJECTION, SOLUTION INTRAVENOUS
Status: DISPENSED
Start: 2021-01-01 | End: 2021-01-01

## 2021-01-01 RX ORDER — ESMOLOL HYDROCHLORIDE 10 MG/ML
25-200 INJECTION, SOLUTION INTRAVENOUS
Status: DISCONTINUED | OUTPATIENT
Start: 2021-01-01 | End: 2021-01-01

## 2021-01-01 RX ORDER — MIDAZOLAM HYDROCHLORIDE 2 MG/2ML
INJECTION, SOLUTION INTRAMUSCULAR; INTRAVENOUS
Status: DISPENSED
Start: 2021-01-01 | End: 2021-01-01

## 2021-01-01 RX ORDER — ATORVASTATIN CALCIUM 80 MG/1
80 TABLET, FILM COATED ORAL DAILY
COMMUNITY

## 2021-01-01 RX ORDER — MEXILETINE HYDROCHLORIDE 150 MG/1
150 CAPSULE ORAL EVERY 8 HOURS SCHEDULED
Status: DISCONTINUED | OUTPATIENT
Start: 2021-01-01 | End: 2021-01-01 | Stop reason: HOSPADM

## 2021-01-01 RX ORDER — AMOXICILLIN 250 MG
1 CAPSULE ORAL
Status: DISCONTINUED | OUTPATIENT
Start: 2021-01-01 | End: 2021-01-01 | Stop reason: HOSPADM

## 2021-01-01 RX ORDER — LEVALBUTEROL 1.25 MG/.5ML
1.25 SOLUTION, CONCENTRATE RESPIRATORY (INHALATION) EVERY 8 HOURS PRN
Status: DISCONTINUED | OUTPATIENT
Start: 2021-01-01 | End: 2021-01-01

## 2021-01-01 RX ORDER — AMIODARONE HYDROCHLORIDE 200 MG/1
400 TABLET ORAL 2 TIMES DAILY
Status: DISCONTINUED | OUTPATIENT
Start: 2021-01-01 | End: 2021-01-01

## 2021-01-01 RX ORDER — AMIODARONE HYDROCHLORIDE 200 MG/1
200 TABLET ORAL DAILY
COMMUNITY
End: 2021-01-01 | Stop reason: HOSPADM

## 2021-01-01 RX ORDER — ASPIRIN 81 MG/1
81 TABLET, CHEWABLE ORAL DAILY
Status: DISCONTINUED | OUTPATIENT
Start: 2021-01-01 | End: 2021-01-01

## 2021-01-01 RX ORDER — ATORVASTATIN CALCIUM 80 MG/1
80 TABLET, FILM COATED ORAL DAILY
Status: DISCONTINUED | OUTPATIENT
Start: 2021-01-01 | End: 2021-01-01

## 2021-01-01 RX ORDER — FENTANYL CITRATE 50 UG/ML
50 INJECTION, SOLUTION INTRAMUSCULAR; INTRAVENOUS EVERY 2 HOUR PRN
Status: DISCONTINUED | OUTPATIENT
Start: 2021-01-01 | End: 2021-01-01

## 2021-01-01 RX ORDER — POTASSIUM CHLORIDE 20 MEQ/1
40 TABLET, EXTENDED RELEASE ORAL ONCE
Status: COMPLETED | OUTPATIENT
Start: 2021-01-01 | End: 2021-01-01

## 2021-01-01 RX ORDER — HEPARIN SODIUM 5000 [USP'U]/ML
5000 INJECTION, SOLUTION INTRAVENOUS; SUBCUTANEOUS EVERY 8 HOURS SCHEDULED
Status: DISCONTINUED | OUTPATIENT
Start: 2021-01-01 | End: 2021-01-01

## 2021-01-01 RX ORDER — FENTANYL CITRATE 50 UG/ML
INJECTION, SOLUTION INTRAMUSCULAR; INTRAVENOUS
Status: DISPENSED
Start: 2021-01-01 | End: 2021-01-01

## 2021-01-01 RX ORDER — LIDOCAINE HYDROCHLORIDE ANHYDROUS AND DEXTROSE MONOHYDRATE .8; 5 G/100ML; G/100ML
1 INJECTION, SOLUTION INTRAVENOUS CONTINUOUS
Status: DISCONTINUED | OUTPATIENT
Start: 2021-01-01 | End: 2021-01-01

## 2021-01-01 RX ORDER — MIDAZOLAM HYDROCHLORIDE 2 MG/2ML
2 INJECTION, SOLUTION INTRAMUSCULAR; INTRAVENOUS ONCE
Status: COMPLETED | OUTPATIENT
Start: 2021-01-01 | End: 2021-01-01

## 2021-01-01 RX ORDER — MEXILETINE HYDROCHLORIDE 150 MG/1
150 CAPSULE ORAL EVERY 8 HOURS SCHEDULED
Qty: 90 CAPSULE | Refills: 3 | Status: SHIPPED | OUTPATIENT
Start: 2021-01-01 | End: 2021-06-18

## 2021-01-01 RX ORDER — SPIRONOLACTONE 25 MG/1
12.5 TABLET ORAL DAILY
COMMUNITY

## 2021-01-01 RX ORDER — ASPIRIN 81 MG/1
81 TABLET, CHEWABLE ORAL DAILY
Status: DISCONTINUED | OUTPATIENT
Start: 2021-01-01 | End: 2021-01-01 | Stop reason: HOSPADM

## 2021-01-01 RX ORDER — EZETIMIBE 10 MG/1
10 TABLET ORAL DAILY
COMMUNITY

## 2021-01-01 RX ORDER — EZETIMIBE 10 MG/1
10 TABLET ORAL DAILY
Status: DISCONTINUED | OUTPATIENT
Start: 2021-01-01 | End: 2021-01-01

## 2021-01-01 RX ORDER — ACETAMINOPHEN 160 MG/5ML
650 SUSPENSION, ORAL (FINAL DOSE FORM) ORAL EVERY 6 HOURS PRN
Status: DISCONTINUED | OUTPATIENT
Start: 2021-01-01 | End: 2021-01-01 | Stop reason: HOSPADM

## 2021-01-01 RX ADMIN — CHLORHEXIDINE GLUCONATE 15 ML: 1.2 SOLUTION ORAL at 08:17

## 2021-01-01 RX ADMIN — ATORVASTATIN CALCIUM 80 MG: 80 TABLET, FILM COATED ORAL at 15:49

## 2021-01-01 RX ADMIN — HEPARIN SODIUM 5000 UNITS: 5000 INJECTION INTRAVENOUS; SUBCUTANEOUS at 21:16

## 2021-01-01 RX ADMIN — TICAGRELOR 60 MG: 60 TABLET ORAL at 20:18

## 2021-01-01 RX ADMIN — METOCLOPRAMIDE 10 MG: 5 INJECTION, SOLUTION INTRAMUSCULAR; INTRAVENOUS at 21:02

## 2021-01-01 RX ADMIN — METOPROLOL TARTRATE 25 MG: 25 TABLET, FILM COATED ORAL at 10:41

## 2021-01-01 RX ADMIN — TICAGRELOR 60 MG: 60 TABLET ORAL at 21:52

## 2021-01-01 RX ADMIN — INSULIN LISPRO 1 UNITS: 100 INJECTION, SOLUTION INTRAVENOUS; SUBCUTANEOUS at 00:05

## 2021-01-01 RX ADMIN — ACETAMINOPHEN 650 MG: 650 SUSPENSION ORAL at 06:00

## 2021-01-01 RX ADMIN — AMIODARONE HYDROCHLORIDE 400 MG: 200 TABLET ORAL at 08:16

## 2021-01-01 RX ADMIN — EZETIMIBE 10 MG: 10 TABLET ORAL at 08:32

## 2021-01-01 RX ADMIN — MEXILETINE HYDROCHLORIDE 150 MG: 150 CAPSULE ORAL at 00:21

## 2021-01-01 RX ADMIN — MEXILETINE HYDROCHLORIDE 150 MG: 150 CAPSULE ORAL at 00:06

## 2021-01-01 RX ADMIN — HEPARIN SODIUM 5000 UNITS: 5000 INJECTION INTRAVENOUS; SUBCUTANEOUS at 09:20

## 2021-01-01 RX ADMIN — AMIODARONE HYDROCHLORIDE 200 MG: 200 TABLET ORAL at 21:16

## 2021-01-01 RX ADMIN — METOPROLOL TARTRATE 25 MG: 25 TABLET, FILM COATED ORAL at 20:16

## 2021-01-01 RX ADMIN — MEXILETINE HYDROCHLORIDE 150 MG: 150 CAPSULE ORAL at 15:49

## 2021-01-01 RX ADMIN — EZETIMIBE 10 MG: 10 TABLET ORAL at 07:50

## 2021-01-01 RX ADMIN — MEXILETINE HYDROCHLORIDE 150 MG: 150 CAPSULE ORAL at 08:34

## 2021-01-01 RX ADMIN — HEPARIN SODIUM 5000 UNITS: 5000 INJECTION INTRAVENOUS; SUBCUTANEOUS at 05:27

## 2021-01-01 RX ADMIN — MEXILETINE HYDROCHLORIDE 150 MG: 150 CAPSULE ORAL at 23:49

## 2021-01-01 RX ADMIN — HEPARIN SODIUM 5000 UNITS: 5000 INJECTION INTRAVENOUS; SUBCUTANEOUS at 14:45

## 2021-01-01 RX ADMIN — METOPROLOL TARTRATE 25 MG: 25 TABLET, FILM COATED ORAL at 08:16

## 2021-01-01 RX ADMIN — SODIUM CHLORIDE 250 ML: 0.9 INJECTION, SOLUTION INTRAVENOUS at 18:15

## 2021-01-01 RX ADMIN — TICAGRELOR 60 MG: 60 TABLET ORAL at 07:49

## 2021-01-01 RX ADMIN — ATORVASTATIN CALCIUM 80 MG: 80 TABLET, FILM COATED ORAL at 15:35

## 2021-01-01 RX ADMIN — PANTOPRAZOLE SODIUM 40 MG: 40 INJECTION, POWDER, FOR SOLUTION INTRAVENOUS at 09:19

## 2021-01-01 RX ADMIN — CHLORHEXIDINE GLUCONATE 15 ML: 1.2 SOLUTION ORAL at 01:30

## 2021-01-01 RX ADMIN — MEXILETINE HYDROCHLORIDE 150 MG: 150 CAPSULE ORAL at 08:17

## 2021-01-01 RX ADMIN — PROPOFOL 15 MCG/KG/MIN: 10 INJECTION, EMULSION INTRAVENOUS at 06:18

## 2021-01-01 RX ADMIN — ATORVASTATIN CALCIUM 80 MG: 80 TABLET, FILM COATED ORAL at 16:59

## 2021-01-01 RX ADMIN — SACUBITRIL AND VALSARTAN 1 TABLET: 24; 26 TABLET, FILM COATED ORAL at 13:37

## 2021-01-01 RX ADMIN — AMIODARONE HYDROCHLORIDE 1 MG/MIN: 50 INJECTION, SOLUTION INTRAVENOUS at 08:49

## 2021-01-01 RX ADMIN — PROPOFOL 40 MCG/KG/MIN: 10 INJECTION, EMULSION INTRAVENOUS at 00:20

## 2021-01-01 RX ADMIN — PHENYLEPHRINE HYDROCHLORIDE 100 MCG/MIN: 10 INJECTION INTRAVENOUS at 06:11

## 2021-01-01 RX ADMIN — AMIODARONE HYDROCHLORIDE 0.5 MG/MIN: 50 INJECTION, SOLUTION INTRAVENOUS at 01:31

## 2021-01-01 RX ADMIN — AMIODARONE HYDROCHLORIDE 400 MG: 200 TABLET ORAL at 21:41

## 2021-01-01 RX ADMIN — MEXILETINE HYDROCHLORIDE 150 MG: 150 CAPSULE ORAL at 15:36

## 2021-01-01 RX ADMIN — ASPIRIN 81 MG: 81 TABLET, CHEWABLE ORAL at 08:32

## 2021-01-01 RX ADMIN — ACETAMINOPHEN 650 MG: 650 SUSPENSION ORAL at 20:14

## 2021-01-01 RX ADMIN — AMIODARONE HYDROCHLORIDE 400 MG: 200 TABLET ORAL at 20:15

## 2021-01-01 RX ADMIN — ASPIRIN 81 MG: 81 TABLET, CHEWABLE ORAL at 07:50

## 2021-01-01 RX ADMIN — CHLORHEXIDINE GLUCONATE 15 ML: 1.2 SOLUTION ORAL at 08:41

## 2021-01-01 RX ADMIN — HEPARIN SODIUM 5000 UNITS: 5000 INJECTION INTRAVENOUS; SUBCUTANEOUS at 21:42

## 2021-01-01 RX ADMIN — EZETIMIBE 10 MG: 10 TABLET ORAL at 10:37

## 2021-01-01 RX ADMIN — MEXILETINE HYDROCHLORIDE 150 MG: 150 CAPSULE ORAL at 07:49

## 2021-01-01 RX ADMIN — ESMOLOL HYDROCHLORIDE 25 MCG/KG/MIN: 10 INJECTION INTRAVENOUS at 00:40

## 2021-01-01 RX ADMIN — HEPARIN SODIUM 5000 UNITS: 5000 INJECTION INTRAVENOUS; SUBCUTANEOUS at 04:57

## 2021-01-01 RX ADMIN — TICAGRELOR 60 MG: 60 TABLET ORAL at 21:17

## 2021-01-01 RX ADMIN — HEPARIN SODIUM 5000 UNITS: 5000 INJECTION INTRAVENOUS; SUBCUTANEOUS at 13:12

## 2021-01-01 RX ADMIN — AMIODARONE HYDROCHLORIDE 400 MG: 200 TABLET ORAL at 10:41

## 2021-01-01 RX ADMIN — ACETAMINOPHEN 650 MG: 650 SUSPENSION ORAL at 15:35

## 2021-01-01 RX ADMIN — ASPIRIN 81 MG: 81 TABLET, CHEWABLE ORAL at 08:16

## 2021-01-01 RX ADMIN — PHENYLEPHRINE HYDROCHLORIDE 40 MCG/MIN: 10 INJECTION INTRAVENOUS at 00:18

## 2021-01-01 RX ADMIN — INSULIN HUMAN 10 UNITS: 100 INJECTION, SOLUTION PARENTERAL at 00:29

## 2021-01-01 RX ADMIN — METOPROLOL TARTRATE 25 MG: 25 TABLET, FILM COATED ORAL at 21:16

## 2021-01-01 RX ADMIN — MIDAZOLAM HYDROCHLORIDE 2 MG: 2 INJECTION, SOLUTION INTRAMUSCULAR; INTRAVENOUS at 20:30

## 2021-01-01 RX ADMIN — INSULIN LISPRO 1 UNITS: 100 INJECTION, SOLUTION INTRAVENOUS; SUBCUTANEOUS at 16:35

## 2021-01-01 RX ADMIN — FENTANYL CITRATE 50 MCG: 50 INJECTION INTRAMUSCULAR; INTRAVENOUS at 03:29

## 2021-01-01 RX ADMIN — AMIODARONE HYDROCHLORIDE 200 MG: 200 TABLET ORAL at 08:32

## 2021-01-01 RX ADMIN — TICAGRELOR 60 MG: 60 TABLET ORAL at 10:37

## 2021-01-01 RX ADMIN — AMIODARONE HYDROCHLORIDE 1 MG/MIN: 50 INJECTION, SOLUTION INTRAVENOUS at 20:50

## 2021-01-01 RX ADMIN — TICAGRELOR 60 MG: 60 TABLET ORAL at 08:33

## 2021-01-01 RX ADMIN — HEPARIN SODIUM 5000 UNITS: 5000 INJECTION INTRAVENOUS; SUBCUTANEOUS at 21:04

## 2021-01-01 RX ADMIN — LIDOCAINE HYDROCHLORIDE ANHYDROUS AND DEXTROSE MONOHYDRATE 4 MG/MIN: .8; 5 INJECTION, SOLUTION INTRAVENOUS at 00:17

## 2021-01-01 RX ADMIN — Medication 50 MCG/HR: at 00:16

## 2021-01-01 RX ADMIN — ONDANSETRON 4 MG: 2 INJECTION INTRAMUSCULAR; INTRAVENOUS at 19:41

## 2021-01-01 RX ADMIN — DEXTROSE 150 MG: 50 INJECTION, SOLUTION INTRAVENOUS at 00:21

## 2021-01-01 RX ADMIN — POTASSIUM CHLORIDE 40 MEQ: 1500 TABLET, EXTENDED RELEASE ORAL at 10:07

## 2021-01-01 RX ADMIN — MIDAZOLAM 2 MG: 1 INJECTION INTRAMUSCULAR; INTRAVENOUS at 20:30

## 2021-01-01 RX ADMIN — DOCUSATE SODIUM AND SENNOSIDES 1 TABLET: 8.6; 5 TABLET ORAL at 21:04

## 2021-01-01 RX ADMIN — METOPROLOL TARTRATE 25 MG: 25 TABLET, FILM COATED ORAL at 08:33

## 2021-01-01 RX ADMIN — LIDOCAINE HYDROCHLORIDE 20 ML: 10; .005 INJECTION, SOLUTION EPIDURAL; INFILTRATION; INTRACAUDAL; PERINEURAL at 20:13

## 2021-01-01 RX ADMIN — HEPARIN SODIUM 5000 UNITS: 5000 INJECTION INTRAVENOUS; SUBCUTANEOUS at 13:13

## 2021-01-01 RX ADMIN — MEXILETINE HYDROCHLORIDE 150 MG: 150 CAPSULE ORAL at 16:57

## 2021-01-01 RX ADMIN — HEPARIN SODIUM 5000 UNITS: 5000 INJECTION INTRAVENOUS; SUBCUTANEOUS at 05:56

## 2021-01-01 RX ADMIN — ASPIRIN 81 MG: 81 TABLET, CHEWABLE ORAL at 10:37

## 2021-01-01 RX ADMIN — AMIODARONE HYDROCHLORIDE 400 MG: 200 TABLET ORAL at 07:50

## 2021-01-01 RX ADMIN — TICAGRELOR 60 MG: 60 TABLET ORAL at 08:17

## 2021-01-01 RX ADMIN — EZETIMIBE 10 MG: 10 TABLET ORAL at 08:17

## 2021-01-01 RX ADMIN — PANTOPRAZOLE SODIUM 40 MG: 40 INJECTION, POWDER, FOR SOLUTION INTRAVENOUS at 08:17

## 2021-01-01 RX ADMIN — AMIODARONE HYDROCHLORIDE 1 MG/MIN: 50 INJECTION, SOLUTION INTRAVENOUS at 00:34

## 2021-01-01 RX ADMIN — FUROSEMIDE 20 MG: 10 INJECTION, SOLUTION INTRAMUSCULAR; INTRAVENOUS at 00:10

## 2021-01-01 RX ADMIN — METOPROLOL TARTRATE 25 MG: 25 TABLET, FILM COATED ORAL at 07:49

## 2021-01-01 RX ADMIN — MEXILETINE HYDROCHLORIDE 150 MG: 150 CAPSULE ORAL at 10:37

## 2021-01-01 RX ADMIN — LIDOCAINE HYDROCHLORIDE ANHYDROUS AND DEXTROSE MONOHYDRATE 1 MG/MIN: .8; 5 INJECTION, SOLUTION INTRAVENOUS at 05:13

## 2021-05-16 NOTE — PROCEDURES
POC FAST US    Date/Time: 5/16/2021 5:50 PM  Performed by: Noé Pastor MD  Authorized by: Noé Pastor MD     Patient location:  ED and Trauma  Procedure details:     Exam Type:  Diagnostic    Indications: blunt abdominal trauma and blunt chest trauma      Technique: FAST      Views obtained:  Heart - Pericardial sac, RUQ - Kirkland's Pouch, Suprapubic - Pouch of Enzo and LUQ - Splenorenal space    Image quality: diagnostic      Image availability:  Images available in PACS  FAST Findings:     RUQ (Hepatorenal) free fluid: absent      LUQ (Splenorenal) free fluid: absent      Suprapubic free fluid: absent      Cardiac wall motion: identified      Pericardial effusion: absent    Interpretation:     Impressions: negative

## 2021-05-16 NOTE — H&P
H&P Exam - Trauma   Nicolas Sanches 80 y o  female MRN: [id number]  Unit/Bed#: TR 02 Encounter: [id number]    Assessment/Plan   Trauma Alert: Level B  Model of Arrival: Ambulance  Trauma Team: Attending Crista Sandifer and Residents Kelsie Koehler  Consultants: none    Trauma Active Problems: s/p fall on brillinta, head laceration    Trauma Plan:   Follow up [facility], CT C spine  EKG  Troponin  Lac repair    Chief Complaint: head hurts    History of Present Illness   HPI:  Nicolas Sanches is a 80 y o  female s/p syncopal fall on brillinta  States she was walking alone got dizzy; grabbed onto a pole; and she woke up in an ambulance  States she has had falls previously  PMH of MI 2019 2020, s/p ICD placement  DM, HTN    Mechanism:Fall    Review of Systems   Constitutional: Negative for activity change, chills and fever  HENT: Negative for congestion and trouble swallowing  Eyes: Negative for photophobia and visual disturbance  Respiratory: Negative for chest tightness and shortness of breath  Cardiovascular: Negative for chest pain and palpitations  Gastrointestinal: Negative for abdominal distention and abdominal pain  Genitourinary: Negative for difficulty urinating and dysuria  Musculoskeletal: Negative for arthralgias and myalgias  Skin: Negative for color change and pallor  Neurological: Negative for dizziness and weakness  Hematological: Negative for adenopathy  Does not bruise/bleed easily  Psychiatric/Behavioral: Negative for agitation and confusion  All other systems reviewed and are negative  12-point, complete review of systems was reviewed and negative except as stated above  Historical Information     Past Medical History:   Diagnosis Date    CHF (congestive heart failure) ([facility] [address] )     Coronary artery disease      History reviewed  No pertinent surgical history    Social History   Social History     Substance and Sexual Activity   Alcohol Use Not Currently     Social History     Substance and Sexual Activity   Drug Use Not Currently     Social History     Tobacco Use   Smoking Status Never Smoker   Smokeless Tobacco Never Used     E-Cigarette/Vaping     E-Cigarette/Vaping Substances       There is no immunization history on file for this patient  Last Tetanus: tetnaus  Family History: Non-contributory      Meds/Allergies   all current active meds have been reviewed    No Known Allergies      PHYSICAL EXAM    Objective   Vitals:   First set: Temperature: 97 7 °F (36 5 °C) (05/16/21 1736)  Pulse: 101 (05/16/21 1736)  Respirations: 20 (05/16/21 1736)  Blood Pressure: 139/68 (05/16/21 1736)    Primary Survey:   (A) Airway: intact  (B) Breathing: CTAB  (C) Circulation: Pulses:   femoral  2/4  (D) Disabliity:  GCS Total:  15  (E) Expose:  Completed    Secondary Survey:  Physical Exam  Vitals signs and nursing note reviewed  Constitutional:       General: She is not in acute distress  Appearance: She is well-developed  HENT:      Head: Normocephalic  Comments: 3 cm laceration of R occipital region, no hematoma or active bleeding  Nose: Nose normal       Mouth/Throat:      Mouth: Mucous membranes are moist       Pharynx: Oropharynx is clear  Eyes:      Conjunctiva/sclera: Conjunctivae normal       Pupils: Pupils are equal, round, and reactive to light  Neck:      Musculoskeletal: Neck supple  Cardiovascular:      Rate and Rhythm: Normal rate and regular rhythm  Pulses: Normal pulses  Pulmonary:      Effort: Pulmonary effort is normal  No respiratory distress  Breath sounds: Normal breath sounds  Abdominal:      Palpations: Abdomen is soft  Tenderness: There is no abdominal tenderness  Musculoskeletal: Normal range of motion  General: No swelling  Skin:     General: Skin is warm and dry  Neurological:      General: No focal deficit present  Mental Status: She is alert  Cranial Nerves: No cranial nerve deficit  Sensory: No sensory deficit  Motor: No weakness  Psychiatric:         Mood and Affect: Mood normal          Behavior: Behavior normal          Invasive Devices     Peripheral Intravenous Line            Peripheral IV 05/16/21 Left Forearm less than 1 day                Lab Results: Results: I have personally reviewed pertinent reports  Imaging/EKG Studies: Results: I have personally reviewed pertinent reports    , FAST: negative, Chest X-Ray: negative  Other Studies: CT head and neck    Code Status: No Order  Advance Directive and Living Will:      Power of :    POLST:

## 2021-05-16 NOTE — PROGRESS NOTES
attended trauma for Galindo Been   Tyler Duke already on his way  Patient being cared for by staff   available as needed for family support

## 2021-05-16 NOTE — ED PROVIDER NOTES
Emergency Department Airway Evaluation and Management Form    History  Obtained from: EMS/Patient  Patient has no known allergies  Chief Complaint   Patient presents with    Fall     unwittnessed fall     HPI  Syncope  Was walking alone, got dizzy, grabbed a pole to hold herself up, woke up on ground, does not recall why  Hit head  Hx CAD s/p PCI, OHCA, AICD placement  On Brillinta  Past Medical History:   Diagnosis Date    CHF (congestive heart failure) (HCC)     Coronary artery disease      History reviewed  No pertinent surgical history  History reviewed  No pertinent family history  Social History     Tobacco Use    Smoking status: Never Smoker    Smokeless tobacco: Never Used   Substance Use Topics    Alcohol use: Not Currently    Drug use: Not Currently     I have reviewed and agree with the history as documented  Review of Systems   Constitutional: Negative for chills, fatigue, fever and unexpected weight change  HENT: Negative for congestion, rhinorrhea and sore throat  Eyes: Negative for redness and visual disturbance  Respiratory: Negative for cough and shortness of breath  Cardiovascular: Negative for chest pain and leg swelling  Gastrointestinal: Negative for abdominal pain, constipation, diarrhea, nausea and vomiting  Endocrine: Negative for cold intolerance and heat intolerance  Genitourinary: Negative for dysuria, frequency and urgency  Musculoskeletal: Negative for back pain  Skin: Negative for rash  Neurological: Positive for syncope  Negative for dizziness and numbness  All other systems reviewed and are negative  Physical Exam  /58 (BP Location: Other (Comment)) Comment (BP Location): R radial  Pulse 74   Temp 97 7 °F (36 5 °C) (Tympanic)   Resp 18   Ht 5' 7" (1 702 m)   Wt 65 2 kg (143 lb 11 8 oz)   SpO2 100%   BMI 22 51 kg/m²     Physical Exam  Vitals signs and nursing note reviewed     Constitutional:       General: She is not in acute distress  Appearance: Normal appearance  She is well-developed  She is not diaphoretic  HENT:      Head: Normocephalic  Nose: Nose normal       Mouth/Throat:      Pharynx: No oropharyngeal exudate  Eyes:      General:         Right eye: No discharge  Left eye: No discharge  Pupils: Pupils are equal, round, and reactive to light  Neck:      Musculoskeletal: Normal range of motion and neck supple  Vascular: No JVD  Trachea: No tracheal deviation  Comments: No midline spinal tenderness, no step offs or deformity  Cardiovascular:      Rate and Rhythm: Normal rate and regular rhythm  Pulses:           Carotid pulses are 2+ on the right side and 2+ on the left side  Radial pulses are 2+ on the right side and 2+ on the left side  Femoral pulses are 2+ on the right side and 2+ on the left side  Dorsalis pedis pulses are 2+ on the right side and 2+ on the left side  Heart sounds: Normal heart sounds  No murmur  Pulmonary:      Effort: Pulmonary effort is normal  No accessory muscle usage or respiratory distress  Breath sounds: No decreased breath sounds, wheezing or rales  Chest:      Chest wall: No deformity, tenderness or edema  Abdominal:      General: Bowel sounds are normal       Palpations: Abdomen is soft  Abdomen is not rigid  There is no mass  Tenderness: There is no abdominal tenderness  There is no rebound  Comments: No ecchymosis or abrasions, no seat belt sign   Musculoskeletal: Normal range of motion  General: No tenderness  Skin:     General: Skin is warm and dry  Capillary Refill: Capillary refill takes less than 2 seconds  Findings: No rash  Neurological:      Mental Status: She is alert and oriented to person, place, and time  GCS: GCS eye subscore is 4  GCS verbal subscore is 5  GCS motor subscore is 6  Cranial Nerves: No cranial nerve deficit  Sensory: No sensory deficit  Motor: No abnormal muscle tone  Coordination: Coordination normal        ED Medications  Medications   tetanus-diphtheria-acellular pertussis (BOOSTRIX) IM injection 0 5 mL (0 5 mL Intramuscular Not Given 5/16/21 1848)   amiodarone (CORDARONE) 900 mg in dextrose 5 % 500 mL infusion (1 mg/min Intravenous New Bag 5/16/21 2050)   ondansetron (FOR EMS ONLY) (ZOFRAN) 4 mg/2 mL injection 4 mg (0 mg Does not apply Given to EMS 5/16/21 1811)   ondansetron (FOR EMS ONLY) (ZOFRAN) 4 mg/2 mL injection 4 mg (0 mg Does not apply Given to EMS 5/16/21 1811)   sodium chloride 0 9 % bolus 250 mL (0 mL Intravenous Stopped 5/16/21 1915)   lidocaine-epinephrine (XYLOCAINE-MPF/EPINEPHRINE) 1%-1:200,000 injection 20 mL (20 mL Infiltration Given 5/16/21 2013)   midazolam (VERSED) injection 2 mg (2 mg Intravenous Given 5/16/21 2030)     Intubation  Arterial Line    Date/Time: 5/16/2021 8:45 PM  Performed by: Reece Trejo MD  Authorized by: Reece Trejo MD     Patient location:  ED  Other Assisting Provider: Yes (comment) Eladio Smith)    Consent:     Consent obtained:  Emergent situation  Universal protocol:     Patient identity confirmed: Anonymous protocol, patient vented/unresponsive and hospital-assigned identification number  Indications:     Indications: hemodynamic monitoring    Pre-procedure details:     Skin preparation:  Chlorhexidine    Preparation: Patient was prepped and draped in sterile fashion    Anesthesia (see MAR for exact dosages): Anesthesia method:  None  Procedure details:     Location / Tip of Catheter:  Radial    Laterality:  Right    Needle gauge:  20 G    Placement technique:  Percutaneous    Number of attempts:  1    Successful placement: yes      Transducer: waveform confirmed    Post-procedure details:     Post-procedure:  Sterile dressing applied and sutured    CMS:  Normal    Patient tolerance of procedure:   Tolerated well, no immediate complications  CriticalCare Time  Performed by: Binh Luciano Wanda Boyle MD  Authorized by: Alina Cheung MD     Critical care provider statement:     Critical care time (minutes):  30    Critical care time was exclusive of:  Separately billable procedures and treating other patients and teaching time    Critical care was necessary to treat or prevent imminent or life-threatening deterioration of the following conditions:  Cardiac failure    Critical care was time spent personally by me on the following activities:  Obtaining history from patient or surrogate, development of treatment plan with patient or surrogate, discussions with consultants, examination of patient, evaluation of patient's response to treatment, review of old charts, re-evaluation of patient's condition, ordering and review of radiographic studies, ordering and review of laboratory studies, ordering and performing treatments and interventions and interpretation of cardiac output measurements        Notes  Airway patient - no acute intervention  2025 Called emergently to patient bedside  Unresponsive in [facility] [place], operated by [facility]  Sync cardioverted at  200, no response, lidocaine given, Sync cardiovert at 200, no response  Repeat Sync Cardiovert at 360, no change Magnesium, Calcium given  Addition cardioversions at 360J given for a total of 10 (TEN) cardioversions  See rhythm strips in chart  ROSC obtained  Amiodarone infusion at 1mg/min started  Family and patient updated  D/w Trauma team, D/w CCM team  Will admit to [facility]  Repeat CTH given the change in clinical condition      Final Diagnosis  Final diagnoses:   Ventricular tachycardia [facility])   Syncope     ED Provider  Electronically Signed by     Alina Cheung MD  05/16/21 3742

## 2021-05-17 PROBLEM — E11.9 TYPE 2 DIABETES MELLITUS (HCC): Status: ACTIVE | Noted: 2021-01-01

## 2021-05-17 PROBLEM — I47.2 VENTRICULAR TACHYCARDIA (HCC): Status: ACTIVE | Noted: 2021-01-01

## 2021-05-17 PROBLEM — E78.5 HYPERLIPIDEMIA: Status: ACTIVE | Noted: 2021-01-01

## 2021-05-17 PROBLEM — I25.10 CAD (CORONARY ARTERY DISEASE): Status: ACTIVE | Noted: 2021-01-01

## 2021-05-17 PROBLEM — N17.9 AKI (ACUTE KIDNEY INJURY) (HCC): Status: ACTIVE | Noted: 2021-01-01

## 2021-05-17 PROBLEM — I50.9 HEART FAILURE (HCC): Status: ACTIVE | Noted: 2021-01-01

## 2021-05-17 PROBLEM — E87.2 METABOLIC ACIDOSIS: Status: ACTIVE | Noted: 2021-01-01

## 2021-05-17 PROBLEM — S01.01XA SCALP LACERATION: Status: ACTIVE | Noted: 2021-01-01

## 2021-05-17 PROBLEM — E87.5 HYPERKALEMIA: Status: ACTIVE | Noted: 2021-01-01

## 2021-05-17 NOTE — RESPIRATORY THERAPY NOTE
RT Protocol Note  Rehana Mace 80 y o  female MRN: [id number]  Unit/Bed#: [facility] 518-01 Encounter: [id number]    Assessment    Principal Problem:    Ventricular tachycardia ([facility] )  Active Problems:    Heart failure (HCC)    Hyperlipidemia    CAD (coronary artery disease)    Type 2 diabetes mellitus (HCC)    Hyperkalemia    YOLIS (acute kidney injury) ([facility] [address] )    Metabolic acidosis    Scalp laceration      Home Pulmonary Medications:  n/a       Past Medical History:   Diagnosis Date    [facility]     Coronary artery disease      Social History     Socioeconomic History    Marital status: /Civil Union     Spouse name: None    Number of children: None    Years of education: None    Highest education level: None   Occupational History    None   Social Needs    Financial resource strain: None    Food insecurity     Worry: None     Inability: None    Transportation needs     Medical: None     Non-medical: None   Tobacco Use    Smoking status: Never Smoker    Smokeless tobacco: Never Used   Substance and Sexual Activity    Alcohol use: Not Currently    Drug use: Not Currently    Sexual activity: None   Lifestyle    Physical activity     Days per week: None     Minutes per session: None    Stress: None   Relationships    Social connections     Talks on phone: None     Gets together: None     Attends Mosque service: None     Active member of club or organization: None     Attends meetings of clubs or organizations: None     Relationship status: None    Intimate partner violence     Fear of current or ex partner: None     Emotionally abused: None     Physically abused: None     Forced sexual activity: None   Other Topics Concern    None   Social History Narrative    None       Subjective         Objective    Physical Exam:   Assessment Type: (P) Assess only  General Appearance: (P) Eyes open/responds to stimulus  Respiratory Pattern: (P) Assisted  Chest Assessment: (P) Chest expansion symmetrical  Bilateral Breath Sounds: (P) Clear  R Breath Sounds: (P) Clear  L Breath Sounds: (P) Clear    Vitals:  Blood pressure 123/61, pulse (!) 54, temperature 99 3 °F (37 4 °C), resp  rate (!) 10, height 5' 7" (1 702 m), weight 70 6 kg (155 lb 10 3 oz), SpO2 100 %  Results from last 7 days   Lab Units 05/17/21  0747   PH ART  7 504*   PCO2 ART mm Hg 23 5*   PO2 ART mm Hg 228 4*   HCO3 ART mmol/L 18 1*   BASE EXC ART mmol/L -3 3   O2 CONTENT ART mL/dL 18 1   O2 HGB, ARTERIAL % 98 3*   ABG SOURCE  Line, Arterial       Imaging and other studies: I have personally reviewed pertinent reports  Plan    Respiratory Plan: Vent/NIV/HFNC        Resp Comments: Pt evaluated per resp protocol s/p admission and subsequent intubation after multiple cardioversions  Pt has no documented pulmonary hx nor does she take any pulmonary meds @ home  There is no indication for scheduled bronchodilators  Will order pt on albuterol 2 5mg/nss q4prn while she remains inpatient

## 2021-05-17 NOTE — PROGRESS NOTES
Received [employer] from nurse that patient was coding so I went to provide support for pt's    encouraged storytelling and offered support         05/16/21 2181   Spiritual Beliefs/Perceptions   Support Systems Spouse/significant other   Plan of Care   Assessment Completed by: Unit visit

## 2021-05-17 NOTE — H&P
H&P Exam - Critical Care   Shane Brandon 80 y o  female MRN: [id number]  [facility] Encounter: [id number]      -------------------------------------------------------------------------------------------------------------  Chief Complaint:  Recurrent VT    History of Present Illness   HX and PE limited by: Intubation, altered mental status  Shane Brandon is a 80 y o  female with past medical history of multivessel CAD, diabetes, and heart failure with reported EF of 30% who now presents with recurrent ventricular tachycardia  Patient suffered a syncopal event at home, witnessed by  who alerted EMS  Was initially alert in as a trauma or fall underwent found to have a posterior scalp laceration, no further traumatic injuries  In route to the hospital had PT, was cardioverted by EMS in with return to sinus rhythm  Hold the emergency department patient had additional episodes of ventricular tachycardia, was initially shocked by ICD 4 times prior to Mag and being placed on ICD with subsequent manual shocks with life pack  Patient had 3 separate episodes of sustained VT requiring a total 42 shocks  She was bolused with amiodarone and lidocaine multiple times and placed on both amnio in light of infusions  Patient was intubated to decreased cardiac irritability  Third episode of VT occurred immediately following central line placement, chest x-ray with line in adequate position  Lab work unrevealing for potential cause, EKG without ischemic changes, no bradycardia or prolonged QT  Patient's  states that she had an admission at [facility] in 2019 for heart failure during which she had cardiac catheterization with stents placed  He believes that she had residual cardiac disease however this was unable to be intervened upon  At that point she was diagnosed with decrease EF of 30% and a single-chamber ICD was placed    Could not find evidence of cardiac catheterization or prior echocardiogram in Care everywhere  Patient now presents to the ICU in sinus rhythm maintained on amiodarone and lidocaine infusions  History obtained from spouse, chart review and unobtainable from patient due to mental status   -------------------------------------------------------------------------------------------------------------  Assessment and Plan:    Neuro:    Analgesia:  P r n  Tylenol, fentanyl   Sedation:  Propofol and fentanyl infusions p r n  Fentanyl as above  o Sedate patient to RASS of -3 to 4 to decrease cardiac irritability  o Correct GCS 6T, plan for sedation break and morning   Delirium PPX: CAM-ICU, sleep hygiene    Posterior head laceration:  Trauma to repair, will need tetanus vaccine   Calcified Falx:  CT head with 5 mm linear hyperdensity at the falx, question calcification versus tiny subdural  o Stable on to close interval repeat head CTs low concern for subdural hemorrhage  o Repeat CT head in 24 hours         CV:    Recurrent ventricular tachycardia:  Unclear etiology  Lab work unrevealing as far as electrolyte cause, troponin negligible, no ischemic changes on EKG  o Continue amiodarone 1, lidocaine 4  o Continue magnet on ICD to allow for manual defibrillation if needed    Will contact [employer] formal ICD interrogation  o Discussed case with Cardiology, recommend repeat boluses of amiodarone  o Will start esmolol with target heart rate of 60  o Formal echo ordered, will obtain bedside point of care ultrasound  o Optimize electrolytes K greater than 4 5 and Mag greater than 2  o EP consult  o Would consider cardiac catheterization to rule out ischemic disease, Concern with this scar mediated in light of below may require cardiac MRI   Heart failure:  Patient's  reports EF of 30%  o Echo ordered   History of multivessel CAD:  Continue aspirin Brilinta  o Hold prior to admission beta-blocker while on esmolol   Hyperlipidemia:  Statin, Zetia      Pulm:   Acute hypoxemic respiratory failure:  Intubated for airway protection and to decrease cardiac irritability in the emergency department  o Continue AC VC 24/450/6/100%  o Wean FiO2 as tolerated for SpO2 92%  o Ideal body weight 60 kg  o ABG with metabolic acidosis, continue high minute ventilation for compensation        GI:     NPO    Bowel Reg:  Senna S   GI PPX:  P o  Pepcid      :     Acute kidney injury:  Last creatinine from 2019 0 78  o Likely prerenal in setting of recurrent VT, relatively low cardiac output  o Will send urine studies to confirm  o Kauffman in place  o Avoid nephrotoxins  o Trend urine output and renal function tests        F/E/N:    Electrolytes - Monitor/trend - replete based on deficiencies    Metabolic acidosis:  Non anion gap, unclear etiology will trend      Heme/Onc:     Hemoglobin stable   DVT PPX:  Subcutaneous heparin, SCDs        Endo:    Diabetes:  Sliding scale insulin      ID:    Leukocytosis likely reactive   Afebrile monitor off of antibiotics         MSK/Skin:     Turn frequently and reposition        Disposition: Admit to Critical Care   Code Status: Level 1 - Full Code  --------------------------------------------------------------------------------------------------------------  Review of Systems   Unable to perform ROS: Intubated       Review of systems was unable to be performed secondary to Intubation    Physical Exam  HENT:      Head: Normocephalic  Mouth/Throat:      Mouth: Mucous membranes are moist    Eyes:      Pupils: Pupils are equal, round, and reactive to light  Neck:      Musculoskeletal: Normal range of motion and neck supple  Comments: [facility] CV    Cardiovascular:      Rate and Rhythm: Normal rate and regular rhythm  Pulmonary:      Effort: Pulmonary effort is normal       Breath sounds: Normal breath sounds  Comments: Clear mechanical breath sounds    Abdominal:      General: Abdomen is flat  Palpations: Abdomen is soft  Skin:     General: Skin is warm  Capillary Refill: Capillary refill takes less than 2 seconds  Neurological:      GCS: GCS eye subscore is 1  GCS verbal subscore is 1  GCS motor subscore is 4        --------------------------------------------------------------------------------------------------------------  Historical Information   Past Medical History:   Diagnosis Date    CHF (congestive heart failure) (HCC)     Coronary artery disease      History reviewed  No pertinent surgical history  Social History   Social History     Substance and Sexual Activity   Alcohol Use Not Currently     Social History     Substance and Sexual Activity   Drug Use Not Currently     Social History     Tobacco Use   Smoking Status Never Smoker   Smokeless Tobacco Never Used     Exercise History:  Unknown  Family History:   History reviewed  No pertinent family history  I have reviewed this patient's family history and commented on sigificant items within the HPI    Vitals:   Vitals:    05/16/21 1945 05/16/21 2045 05/16/21 2055 05/16/21 2200   BP: 106/55 108/58  108/58   BP Location:  Other (Comment)     Pulse: 82 74  86   Resp:  18  18   Temp:       TempSrc:       SpO2: 95% 100%  97%   Weight:       Height:   5' 7" (1 702 m)      Temp  Min: 97 7 °F (36 5 °C)  Max: 97 7 °F (36 5 °C)  IBW (Ideal Body Weight): 61 6 kg  Height: 5' 7" (170 2 cm)  Body mass index is 22 51 kg/m²    N/A    Medications:  Current Facility-Administered Medications   Medication Dose Route Frequency    amiodarone (CORDARONE) 900 mg in dextrose 5 % 500 mL infusion  1 mg/min Intravenous Continuous    aspirin chewable tablet 81 mg  81 mg Per NG Tube Daily    atorvastatin (LIPITOR) tablet 80 mg  80 mg Per NG Tube Daily With Dinner    chlorhexidine (PERIDEX) 0 12 % oral rinse 15 mL  15 mL Mouth/Throat Q12H [address]    dextrose 50 % IV solution **ADS Override Pull**        esmolol (BREVIBLOC) 2500 mg/250 mL IV infusion (premix)   mcg/kg/min Intravenous Once    esmolol (BREVIBLOC) 2500 mg/250 mL IV infusion (premix)   mcg/kg/min Intravenous Titrated    ezetimibe (ZETIA) tablet 10 mg  10 mg Per NG Tube Daily    fentaNYL 1000 mcg in sodium chloride 0 9% 100mL infusion  50 mcg/hr Intravenous Continuous    fentanyl citrate (PF) 100 MCG/2ML **ADS Override Pull**        heparin (porcine) subcutaneous injection 5,000 Units  5,000 Units Subcutaneous Q8H [address]    insulin lispro (HumaLOG) 100 units/mL subcutaneous injection 1-5 Units  1-5 Units Subcutaneous Q6H [address]    lidocaine 2000 mg in 250 mL infusion (cardiac premix)- NOT FOR TREATMENT OF PAIN  4 mg/min Intravenous Continuous    midazolam (VERSED) 2 mg/2 mL injection **ADS Override Pull**        midazolam (VERSED) 2 mg/2 mL injection **ADS Override Pull**        norepinephrine (LEVOPHED) 1 mg/mL injection **ADS Override Pull**        phenylephrine (AKIL-SYNEPHRINE) 50 mg (STANDARD CONCENTRATION) in sodium chloride 0 9% 250 mL   mcg/min Intravenous Titrated    propofol (DIPRIVAN) 1000 mg in 100 mL infusion (premix)  5-50 mcg/kg/min Intravenous Titrated    tetanus-diphtheria-acellular pertussis (BOOSTRIX) IM injection 0 5 mL  0 5 mL Intramuscular Once    ticagrelor tablet 60 mg  60 mg Per NG Tube Q12H [address]     Home medications:  Prior to Admission Medications   Prescriptions Last Dose Informant Patient Reported? Taking?    Empagliflozin 10 MG TABS   Yes Yes   Sig: Take 10 mg by mouth every morning   amiodarone 200 mg tablet   Yes Yes   Sig: Take 200 mg by mouth daily   aspirin 81 mg chewable tablet   Yes Yes   Sig: Chew 81 mg daily   atorvastatin (LIPITOR) 80 mg tablet   Yes Yes   Sig: Take 80 mg by mouth daily   ezetimibe (ZETIA) 10 mg tablet   Yes Yes   Sig: Take 10 mg by mouth daily   metFORMIN (GLUCOPHAGE) 1000 MG tablet   Yes Yes   Sig: Take 1,000 mg by mouth 2 (two) times a day with meals   metoprolol succinate (TOPROL-XL) 25 mg 24 hr tablet   Yes Yes   Sig: Take 25 mg by mouth daily sacubitril-valsartan (Entresto) 24-26 MG TABS   Yes Yes   Sig: Take 1 tablet by mouth 2 (two) times a day   spironolactone (ALDACTONE) 25 mg tablet   Yes Yes   Sig: Take 12 5 mg by mouth daily   ticagrelor 60 MG   Yes Yes   Sig: Take 60 mg by mouth every 12 (twelve) hours      Facility-Administered Medications: None     Allergies:  No Known Allergies      Laboratory and Diagnostics:  Results from last 7 days   Lab Units 05/16/21 2355 05/16/21 1744 05/16/21 1743   WBC Thousand/uL 17 64*  --  14 29*   HEMOGLOBIN g/dL 12 5  --  13 6   I STAT HEMOGLOBIN g/dl  --  15 0  --    HEMATOCRIT % 39 4  --  42 6   HEMATOCRIT, ISTAT %  --  44  --    PLATELETS Thousands/uL 274  --  322   NEUTROS PCT %  --   --  58   MONOS PCT %  --   --  5     Results from last 7 days   Lab Units 05/16/21 2355 05/16/21 2123 05/16/21 1744 05/16/21 1743   SODIUM mmol/L 137 141  --  137   POTASSIUM mmol/L 4 5 5 6*  --  5 0   CHLORIDE mmol/L 109* 111*  --  104   CO2 mmol/L 17* 21  --  19*   CO2, I-STAT mmol/L  --   --  19*  --    ANION GAP mmol/L 11 9  --  14*   BUN mg/dL 38* 37*  --  42*   CREATININE mg/dL 1 48* 1 53*  --  1 83*   CALCIUM mg/dL 11 1* 10 2*  --  10 0   GLUCOSE RANDOM mg/dL 295* 132  --  171*     Results from last 7 days   Lab Units 05/16/21 2355 05/16/21 2123   MAGNESIUM mg/dL 5 4* 2 8*   PHOSPHORUS mg/dL 3 7 4 1      Results from last 7 days   Lab Units 05/16/21 2355   INR  1 06      Results from last 7 days   Lab Units 05/17/21 0001 05/16/21 2123 05/16/21 1743   TROPONIN I ng/mL 0 17* 0 10* <0 02     Results from last 7 days   Lab Units 05/16/21 2355   LACTIC ACID mmol/L 2 2*     ABG:  Results from last 7 days   Lab Units 05/16/21 2358   PH ART  7 347*   PCO2 ART mm Hg 22 6*   PO2 ART mm Hg 276 7*   HCO3 ART mmol/L 12 1*   BASE EXC ART mmol/L -11 5   ABG SOURCE  Line, Arterial     VBG:  Results from last 7 days   Lab Units 05/16/21  2353   ABG SOURCE  Line, Arterial           Micro:          EKG:  Normal sinus rhythm first-degree AV block,   Imaging:  Chest x-ray:  No pneumothorax, CVC a cavoatrial junction  CT head:  5 mm linear hyperdensity along the anterior interhemispheric falx could represent calcification of the interhemispheric falx, tiny subdural bleed or a calcified vessel  This is unchanged  Follow-up CT head in 24 hours recommended  I have personally reviewed pertinent reports  and I have personally reviewed pertinent films in PACS    ------------------------------------------------------------------------------------------------------------  Advance Directive and Living Will:      Power of :    POLST:    ------------------------------------------------------------------------------------------------------------  Anticipated Length of Stay is > 2 midnights    Counseling / Coordination of Care  Total Critical Care time spent 45 minutes excluding procedures, teaching and family updates  Rich Hernandez PA-C        Portions of the record may have been created with voice recognition software  Occasional wrong word or "sound a like" substitutions may have occurred due to the inherent limitations of voice recognition software    Read the chart carefully and recognize, using context, where substitutions have occurred

## 2021-05-17 NOTE — PROGRESS NOTES
Progress Note - Tertiary Trauma [place]   Belle Calvert 80 y o  female MRN: [id number]  Unit/Bed#: [facility] 410-61 Encounter: [id number]    Summary of Diagnosed Injuries: Scalp laceration    Clinical Plan: Scalp laceration repaired on 5/17  Remove in 7-10 days  Continue to monitor for erythema or warmth around site  Trauma signing off at this time  Otherwise, continue care per CC team       Prophylaxis: heparin    Disposition: Per CC team    Code status:  Level 1 - Full Code    Consultants: Consult to EP      SUBJECTIVE:     Transfer from: N/a  Outside Films Received: not applicable  Tertiary Exam Due on: 5/17    Mechanism of Injury:Fall    Details related to Injury: +LOC:  yes    Chief Complaint: Currently intubated, nods her head "no" when asked if she has any pain    HPI/Last 24 hour events: "Shanti Boyle is a 80 y o  female with past medical history of multivessel CAD, diabetes, and heart failure with reported EF of 30% who now presents with recurrent ventricular tachycardia  Patient suffered a syncopal event at home, witnessed by  who alerted EMS  Was initially alert in as a trauma or fall underwent found to have a posterior scalp laceration, no further traumatic injuries  In route to the hospital had PT, was cardioverted by EMS in with return to sinus rhythm  Hold the emergency department patient had additional episodes of ventricular tachycardia, was initially shocked by ICD 4 times prior to Mag and being placed on ICD with subsequent manual shocks with life pack  Patient had 3 separate episodes of sustained VT requiring a total 42 shocks  She was bolused with amiodarone and lidocaine multiple times and placed on both amnio in light of infusions  Patient was intubated to decreased cardiac irritability  Third episode of VT occurred immediately following central line placement, chest x-ray with line in adequate position    Lab work unrevealing for potential cause, EKG without ischemic changes, no bradycardia or prolonged QT  Patient's  states that she had an admission at [facility] in 2019 for heart failure during which she had cardiac catheterization with stents placed  He believes that she had residual cardiac disease however this was unable to be intervened upon  At that point she was diagnosed with decrease EF of 30% and a single-chamber ICD was placed  Could not find evidence of cardiac catheterization or prior echocardiogram in Care everywhere  Patient now presents to the ICU in sinus rhythm maintained on amiodarone and lidocaine infusions  " - Dr Cyril Chew MD    Patient remains intubated in the ICU, responding to commands, with cardiology consultation      Active medications:           Current Facility-Administered Medications:     acetaminophen (TYLENOL) oral suspension 650 mg, 650 mg, Per NG Tube, Q6H PRN    albuterol inhalation solution 2 5 mg, 2 5 mg, Nebulization, Q4H PRN    amiodarone (CORDARONE) 900 mg in dextrose 5 % 500 mL infusion, 1 mg/min, Intravenous, Continuous, 1 mg/min at 05/17/21 0849    amiodarone tablet 400 mg, 400 mg, Oral, BID, 400 mg at 05/17/21 1041    aspirin chewable tablet 81 mg, 81 mg, Per NG Tube, Daily, 81 mg at 05/17/21 1037    atorvastatin (LIPITOR) tablet 80 mg, 80 mg, Per NG Tube, Daily With Dinner    chlorhexidine (PERIDEX) 0 12 % oral rinse 15 mL, 15 mL, Mouth/Throat, Q12H [address], 15 mL at 05/17/21 0841    dextrose 50 % IV solution **ADS Override Pull**, , ,     esmolol (BREVIBLOC) 2500 mg/250 mL IV infusion (premix),  mcg/kg/min, Intravenous, Titrated, Stopped at 05/17/21 1045    ezetimibe (ZETIA) tablet 10 mg, 10 mg, Per NG Tube, Daily, 10 mg at 05/17/21 1037    fentaNYL 1000 mcg in sodium chloride 0 9% 100mL infusion, 50 mcg/hr, Intravenous, Continuous, 50 mcg/hr at 05/17/21 0016    fentanyl citrate (PF) 100 MCG/2ML 50 mcg, 50 mcg, Intravenous, Q2H PRN, 50 mcg at 05/17/21 0329    heparin (porcine) subcutaneous injection 5,000 Units, 5,000 Units, Subcutaneous, Q8H [address], 5,000 Units at 05/17/21 0920    insulin lispro (HumaLOG) 100 units/mL subcutaneous injection 1-5 Units, 1-5 Units, Subcutaneous, Q6H [address] **AND** Fingerstick Glucose (POCT), , , Q6H    lidocaine 2000 mg in 250 mL infusion (cardiac premix)- NOT FOR TREATMENT OF PAIN, 2 mg/min, Intravenous, Continuous, 1 mg/min at 05/17/21 1046    metoprolol tartrate (LOPRESSOR) tablet 25 mg, 25 mg, Oral, Q12H MARI, 25 mg at 05/17/21 1041    mexiletine (MEXITIL) capsule 150 mg, 150 mg, Oral, Q8H [address], 150 mg at 05/17/21 1037    pantoprazole (PROTONIX) injection 40 mg, 40 mg, Intravenous, Q24H MARI, 40 mg at 05/17/21 0919    phenylephrine (AKIL-SYNEPHRINE) 50 mg (STANDARD CONCENTRATION) in sodium chloride 0 9% 250 mL,  mcg/min, Intravenous, Titrated, 90 mcg/min at 05/17/21 0615    propofol (DIPRIVAN) 1000 mg in 100 mL infusion (premix), 5-50 mcg/kg/min, Intravenous, Titrated, 20 mcg/kg/min at 05/17/21 0840    tetanus-diphtheria-acellular pertussis (BOOSTRIX) IM injection 0 5 mL, 0 5 mL, Intramuscular, Once    ticagrelor tablet 60 mg, 60 mg, Per NG Tube, Q12H [address], 60 mg at 05/17/21 1037      OBJECTIVE:     Vitals:   Vitals:    05/17/21 1000   BP: 132/61   Pulse: 58   Resp: 13   Temp: 99 3 °F (37 4 °C)   SpO2: 100%       Physical Exam:   GENERAL APPEARANCE: Intubated, in no distress  NEURO: Follows commands, limited by intubation  HEENT: PERRL  CV: RRR, no murmurs  LUNGS: Ventilation  GI: No abdominal tenderness, guarding or rigidity  : deferred  MSK: Can move all extremities  SKIN: Scalp laceration that is stapled on occiput        I/O:   I/O       05/15 0701 - 05/16 0700 05/16 0701 - 05/17 0700 05/17 0701 - 05/18 0700    I V  (mL/kg)  745 8 (10 6) 389 5 (5 5)    NG/GT  0 90    IV Piggyback  350     Total Intake(mL/kg)  1095 8 (15 5) 479 5 (6 8)    Urine (mL/kg/hr)  1275 110 (0 4)    Emesis/NG output  0 0    Total Output  1275 110    Net  -179 3 +369 5                 Invasive Devices: Invasive Devices     Central Venous Catheter Line            CVC Central Lines 05/16/21 Triple 1 day          Peripheral Intravenous Line            Peripheral IV 05/16/21 Left Forearm less than 1 day    Peripheral IV 05/16/21 Right Antecubital less than 1 day    Peripheral IV 05/16/21 Right Arm less than 1 day          Arterial Line            Arterial Line 05/16/21 Right Radial less than 1 day          Drain            NG/OG/Enteral Tube Orogastric 18 Fr less than 1 day    Urethral Catheter Temperature probe less than 1 day          Airway            ETT  7 5 mm less than 1 day                  Imaging:   Xr Chest 1 View Portable    Result Date: 5/17/2021  Impression: No acute cardiopulmonary disease  Workstation performed: [id number]     Ct Head Wo Contrast    Result Date: 5/16/2021  Impression: 5 mm linear hyperdensity along the anterior interhemispheric falx could represent calcification of the interhemispheric falx, tiny subdural bleed or a calcified vessel  This is unchanged  Follow-up CT head in 24 hours recommended  Otherwise, no acute intracranial abnormality  The study was marked in [facility] for immediate notification  Workstation performed: [id number]     Trauma - Ct Head Wo Contrast    Result Date: 5/16/2021  Impression: No acute intracranial abnormality  I personally discussed this study with trauma team at [phone] on 5/16/2021 at 5:57 PM  Workstation performed: [id number]     Trauma - Ct Spine Cervical Wo Contrast    Result Date: 5/16/2021  Impression: No cervical spine fracture or traumatic malalignment  Degenerative changes from C5 to C7    I personally discussed this study with trauma team at [phone] on 5/16/2021 at 5:57 PM  Workstation performed: [id number]     Xr Trauma Multiple (slb/slra Trauma [place] Only)    Result Date: 5/17/2021  Impression: No acute pulmonary disease Workstation performed: [id number]       Labs:   CBC:   Lab Results   Component Value Date    WBC 16 13 (H) 05/17/2021    HGB 12 1 05/17/2021    HCT 37 3 05/17/2021    MCV 83 05/17/2021     05/17/2021    MCH 27 1 05/17/2021    MCHC 32 4 05/17/2021    RDW 14 6 05/17/2021    MPV 11 4 05/17/2021    NRBC 0 05/17/2021     CMP:   Lab Results   Component Value Date     (H) 05/17/2021    CO2 20 (L) 05/17/2021    CO2 19 (L) 05/16/2021    BUN 38 (H) 05/17/2021    CREATININE 1 44 (H) 05/17/2021    GLUCOSE 215 (H) 05/16/2021    CALCIUM 10 3 (H) 05/17/2021     (H) 05/17/2021     (H) 05/17/2021    ALKPHOS 89 05/17/2021    EGFR 37 05/17/2021     Phosphorus:   Lab Results   Component Value Date    PHOS 3 3 05/17/2021     Magnesium:   Lab Results   Component Value Date    MG 4 1 (H) 05/17/2021     Urinalysis: No results found for: Donis Campi, SPECGRAV, PHUR, LEUKOCYTESUR, NITRITE, PROTEINUA, GLUCOSEU, KETONESU, BILIRUBINUR, BLOODU  Ionized Calcium: No results found for: CAION  Coagulation:   Lab Results   Component Value Date    INR 1 06 05/16/2021     Troponin:   Lab Results   Component Value Date    TROPONINI 0 76 (H) 05/17/2021     ABG:   Lab Results   Component Value Date    PHART 7 504 (H) 05/17/2021    CCY5LNM 23 5 (LL) 05/17/2021    PO2ART 228 4 (H) 05/17/2021    [id number] 18 1 (L) 05/17/2021    BEART -3 3 05/17/2021    SOURCE Line, Arterial 05/17/2021     Radiology review: Complete

## 2021-05-17 NOTE — TRAUMA DOCUMENTATION
Pt in and out of Ventricular tachycardia  Dr Brock Varela and Lavinia Escobar at bedside  10 shocks delivered by Dr Brock Varela, please see attached strips  R radial a-line started by Dr Alvarado Garcia  100 mg lidocaine given IV at 2026, 2 grams IV magnesium given at 2028, 150 mg amiodarone IV given at 2029, 2 mg IV versed given at 2031, 1 gram of 10% calcium chloride IV given at 2032

## 2021-05-17 NOTE — UTILIZATION REVIEW
Initial Clinical Review    Admission: Date/Time/Statement:   Admission Orders (From admission, onward)     Ordered        05/16/21 2156  Inpatient Admission  Once                   Orders Placed This Encounter   Procedures    Inpatient Admission     Standing Status:   Standing     Number of Occurrences:   1     Order Specific Question:   Level of Care     Answer:   Critical Care [15]     Order Specific Question:   Estimated length of stay     Answer:   More than 2 Midnights     Order Specific Question:   Certification     Answer:   I certify that inpatient services are medically necessary for this patient for a duration of greater than two midnights  See H&P and MD Progress Notes for additional information about the patient's course of treatment  ED Arrival Information     Expected Arrival Acuity Means of Arrival Escorted By Service Admission Type    - 5/16/2021 17:32 Emergent Ambulance [address]    -        Chief Complaint   Patient presents with   Silvana Willson Fall     unwittnessed fall       Initial Presentation: 79 y/o female with PMhx of T2 DM, CHF w/ 30-40%, AD s/p JESSIKA x3, h/o cardiac arrest in Jan 2020, ICD who presents to ED by EMS as a trauma s/p fall  Pt with posterior scalp lac, sutured in ED, underwent trauma w/u in ED with no acute injuries  Pt noted with PT en route and cardioverted by EMS with return to NSR  In ED pt had recurrent VT requiring multiple shocks and intubation  Admit inpatient to critical care with recurrent VT -- Continue AC VC 24/450/6/100%  Wean FiO2 as tuyet for SpO2 92%  ABG with metabolic acidosis  Aadjust vent settings to compensate for metabolic acidosis as needed  Phenylephrine gtt for MAP > 65, esmolol gtt at 25 mcg/kg/min, being tapering lidocaine gtt, continue amiodarone gtt  Continue ASA/Brillinta  Cardiology consulted  damon Kauffman I/O  SCD's  Repeat CT head in 24 hrs       Cardiology consult 5/17 -- VT storm --- Maintain sedation with propofol  Continue lidocaine, amio, esmolol and pressors for hemodynamic support as needed  EP to see patient later today  twtMob interrogation device at bedside  If continues to break through can try propanolol  Atorvastatin 80mg, ezetimibe 10mg   Aspirin 81mg daily, ticagrelor 60mg q12H    Date: 5/17   Day 2: No further VT episodes since admission to ICU  Patient following commands  Pt on minimal ventilator settings, no evidence of aspiration on imaging, SBT this afternoon with plan to extubate if hemodynamically stable  Continue propofol and fentanyl for sedation  If patient tolerates down-titration of antiarrhythmics, patient with small hyperdense area on anterior fax that could be subdural bleed, repeat CTH ordered  Start Mexitil, Amiodarone, resume home metoprolol, d/c Esmolol infusion, downtitrate lidocaine to 1 mg/hr, continue amiodarone infusion, TTE pending, continue to trend troponins  OGT, beside swallow eval s/p extubation  Advance diet per eval  Add bowel regimen  Kauffman cath, Strict I/O's  SCC's    EP consult 5/17 -- She is maintaining sinus rhythm on her current regimen  A thorough device interrogation was performed, which showed episodes of VT with appropriate ICD therapy  It seemed that the inciting event on 05/16 actually did not receive ICD therapy, likely d/t device settings at that time  We thus changed the arrhythmia duration from 30 seconds to 10 seconds to try to treat more aggressively      In terms of her medications, recommend starting amiodarone 40 mg b i d  In addition to remain the on IV amiodarone  Her IV the lidocaine will be decreased, and esmolol will be discontinued at this time  We will try to increase her metoprolol to 25 mg b i d , and will also start mexiletine 150 mg t i d  So that her lidocaine can eventually be discontinued    We will need to trend LFTs in the setting of mexiletine use        ED Triage Vitals   Temperature Pulse Respirations Blood Pressure SpO2   05/16/21 1736 05/16/21 1736 05/16/21 1736 05/16/21 1736 05/16/21 1736   97 7 °F (36 5 °C) 101 20 139/68 97 %      Temp Source Heart Rate Source Patient Position - Orthostatic VS BP Location FiO2 (%)   05/16/21 1736 05/16/21 1736 05/16/21 1736 05/16/21 2045 05/17/21 0800   Tympanic Monitor Lying Other (Comment) 50      Pain Score       05/17/21 0329       Med Not Given for Pain - for MAR use only          Wt Readings from Last 1 Encounters:   05/17/21 70 6 kg (155 lb 10 3 oz)     Additional Vital Signs:   Date/Time  Temp  Pulse  Resp  BP  MAP (mmHg)  Arterial Line BP  MAP  SpO2  FiO2 (%)  O2 Device  Patient Position - Orthostatic VS   05/17/21 1500  98 6 °F (37 °C)  58  19  116/55  83  126/54  82 mmHg  98 %  --  --  --   05/17/21 1200  100 °F (37 8 °C)  50Abnormal   12  113/61  75  120/54  76 mmHg  99 %  --  Ventilator  --   05/17/21 1100  99 7 °F (37 6 °C)  52Abnormal   16  109/55  76  106/40  64 mmHg  100 %  --  --  --   05/17/21 1000  99 3 °F (37 4 °C)  58  13  132/61  91  124/46  76 mmHg  100 %  --  --  --   05/17/21 0900  99 3 °F (37 4 °C)  54Abnormal   10Abnormal   123/61  84  116/44  70 mmHg  100 %  --  --  --   05/17/21 0800  99 °F (37 2 °C)  56  18  128/65  86  122/46  74 mmHg  100 %  50  Ventilator  --   05/17/21 0600  98 6 °F (37 °C)  54Abnormal   0Abnormal   137/67  91  132/50  80 mmHg  100 %  --  --  --   05/17/21 0300  97 5 °F (36 4 °C)  52Abnormal   24Abnormal   --  --  122/48  74 mmHg  100 %  --  --  --   05/17/21 0230  97 5 °F (36 4 °C)  54Abnormal   24Abnormal   --  --  118/46  72 mmHg  100 %  --  --  --   05/17/21 0215  97 5 °F (36 4 °C)  54Abnormal   24Abnormal   --  --  118/46  72 mmHg  100 %  --  --  --   05/17/21 0200  97 5 °F (36 4 °C)  54Abnormal   24Abnormal   --  --  120/48  74 mmHg  100 %  --  --  --   05/17/21 0130  97 5 °F (36 4 °C)  54Abnormal   24Abnormal   --  --  102/40  62 mmHg  100 %  --  --  --   05/17/21 0020  --  80  5Abnormal   --  --  136/52 84 mmHg  100 %  --  --  --   05/17/21 0000  --  78  5Abnormal   148/68  99  94/36  58 mmHg  100 %  --  --  --   05/16/21 2200  --  86  18  108/58  --  --  --  97 %  --  --  --   05/16/21 2045  --  74  18  108/58  75  --  --  100 %  --  None (Room air)  Sitting   05/16/21 17:42:36  --  100  20  129/61  --  --  --  98 %  --  --  --   05/16/21 17:36:05  97 7 °F (36 5 °C)  101  20  139/68  --  --  --  97 %  --  --  Lying       Pertinent Labs/Diagnostic Test Results:   EKG 5/17 -- Sinus bradycardia with 1st degree A-V block  Low voltage QRS  Septal infarct (cited on or before 16-MAY-2021)  Prolonged QT    EKG 5/16 -- Normal sinus rhythm with 1st degree A-V block  Septal infarct (cited on or before 16-MAY-2021)  Abnormal ECG    CT head 5/16 -- 5 mm linear hyperdensity along the anterior interhemispheric falx could represent calcification of the interhemispheric falx, tiny subdural bleed or a calcified vessel   This is unchanged   Follow-up CT head in 24 hours recommended  Otherwise, no acute intracranial abnormality  CT c-spine 5/16 -- No cervical spine fracture or traumatic malalignment   Degenerative changes from C5 to C7      CR 5/17 -- No acute pulmonary disease       Results from last 7 days   Lab Units 05/17/21  0507 05/16/21 2355 05/16/21 2319 05/16/21  1744 05/16/21  1743   WBC Thousand/uL 16 13* 17 64*  --   --  14 29*   HEMOGLOBIN g/dL 12 1 12 5  --   --  13 6   I STAT HEMOGLOBIN g/dl  --   --  12 9 15 0  --    HEMATOCRIT % 37 3 39 4  --   --  42 6   HEMATOCRIT, ISTAT %  --   --  38 44  --    PLATELETS Thousands/uL 303 274  --   --  322   NEUTROS ABS Thousands/µL 12 92*  --   --   --  8 39*     Results from last 7 days   Lab Units 05/17/21  0507 05/16/21 2355 05/16/21 2319 05/16/21  2123 05/16/21  1744 05/16/21  1743   SODIUM mmol/L 137 137  --  141  --  137   POTASSIUM mmol/L 5 2 4 5  --  5 6*  --  5 0   CHLORIDE mmol/L 110* 109*  --  111*  --  104   CO2 mmol/L 20* 17*  --  21  --  19*   CO2, I-STAT mmol/L  --   --  19*  --  19*  --    ANION GAP mmol/L 7 11  --  9  --  14*   BUN mg/dL 38* 38*  --  37*  --  42*   CREATININE mg/dL 1 44* 1 48*  --  1 53*  --  1 83*   EGFR ml/min/1 73sq m 37 36  --  34  --  28   CALCIUM mg/dL 10 3* 11 1*  --  10 2*  --  10 0   CALCIUM, IONIZED mmol/L 1 30 1 45*  --   --   --   --    MAGNESIUM mg/dL 4 1* 5 4*  --  2 8*  --   --    PHOSPHORUS mg/dL 3 3 3 7  --  4 1  --   --      Results from last 7 days   Lab Units 05/17/21  0507   AST U/L 103*   ALT U/L 142*   ALK PHOS U/L 89   TOTAL PROTEIN g/dL 6 3*   ALBUMIN g/dL 3 3*   TOTAL BILIRUBIN mg/dL 1 00     Results from last 7 days   Lab Units 05/17/21  1127 05/17/21  0610 05/17/21  0204   POC GLUCOSE mg/dl 125 137 192*     Results from last 7 days   Lab Units 05/17/21  0507 05/16/21  2355 05/16/21  2123 05/16/21  1743   GLUCOSE RANDOM mg/dL 178* 295* 132 171*     Results from last 7 days   Lab Units 05/17/21  0747 05/17/21  0508 05/17/21  0206   PH ART  7 504* 7 477* 7 468*   PCO2 ART mm Hg 23 5* 24 4* 25 8*   PO2 ART mm Hg 228 4* 215 2* 322 0*   HCO3 ART mmol/L 18 1* 17 6* 18 3*   BASE EXC ART mmol/L -3 3 -4 2 -3 9   O2 CONTENT ART mL/dL 18 1 18 1 18 8   O2 HGB, ARTERIAL % 98 3* 98 2* 98 7*   ABG SOURCE  Line, Arterial Line, Arterial Line, Arterial     Results from last 7 days   Lab Units 05/17/21  0206   PH KELLIE  7 433*   PCO2 KELLIE mm Hg 33 2*   PO2 KELLIE mm Hg 34 5*   HCO3 KELLIE mmol/L 21 7*   BASE EXC KELLIE mmol/L -1 8   O2 CONTENT KELLIE ml/dL 12 2   O2 HGB, VENOUS % 68 1     Results from last 7 days   Lab Units 05/16/21  2319 05/16/21  1744   PH, KELLIE I-STAT  7 237* 7 408*   PCO2, KELLIE ISTAT mm HG 42 1 29 2*   PO2, KELLIE ISTAT mm HG 65 0* 37 0   HCO3, KELLIE ISTAT mmol/L 17 9* 18 4*   I STAT BASE EXC mmol/L -9* -5*   I STAT O2 SAT % 88* 73         Results from last 7 days   Lab Units 05/17/21  1313 05/17/21  1042 05/17/21  0747 05/17/21  0001 05/16/21  2123 05/16/21  1743   TROPONIN I ng/mL 0 79* 0 83* 0 76* 0 17* 0 10* <0 02     Results from last 7 days   Lab Units 05/16/21  2355   PROTIME seconds 13 8   INR  1 06       Results from last 7 days   Lab Units 05/17/21  0206 05/16/21  2355   LACTIC ACID mmol/L 2 0 2 2*     ED Treatment:   Medication Administration from 05/16/2021 1725 to 05/16/2021 2345       Date/Time Order Dose Route Action     05/16/2021 1815 sodium chloride 0 9 % bolus 250 mL 250 mL Intravenous New Bag     05/16/2021 2013 lidocaine-epinephrine (XYLOCAINE-MPF/EPINEPHRINE) 1%-1:200,000 injection 20 mL 20 mL Infiltration Given     05/16/2021 2050 amiodarone (CORDARONE) 900 mg in dextrose 5 % 500 mL infusion 1 mg/min Intravenous New Bag     05/16/2021 2030 midazolam (VERSED) injection 2 mg 2 mg Intravenous Given     Past Medical History:   Diagnosis Date    CHF (congestive heart failure) ([facility] [address] )     Coronary artery disease      Admitting Diagnosis: Syncope [R55]  Ventricular tachycardia ([facility] [address] ) [I47 2]  Injury, unspecified, initial encounter [T14 90XA]  Other injury of unspecified body region, initial encounter [T14  8XXA]  Age/Sex: 80 y o  female  Admission Orders:  Scheduled Medications:  amiodarone, 400 mg, Oral, BID  aspirin, 81 mg, Per NG Tube, Daily  atorvastatin, 80 mg, Per NG Tube, Daily With Dinner  chlorhexidine, 15 mL, Mouth/Throat, Q12H [address]  ezetimibe, 10 mg, Per NG Tube, Daily  heparin (porcine), 5,000 Units, Subcutaneous, Q8H [address]  insulin lispro, 1-5 Units, Subcutaneous, Q6H MARI  metoprolol tartrate, 25 mg, Oral, Q12H MARI  mexiletine, 150 mg, Oral, Q8H MARI  pantoprazole, 40 mg, Intravenous, Q24H [address]  tetanus-diphtheria-acellular pertussis, 0 5 mL, Intramuscular, Once  ticagrelor, 60 mg, Per NG Tube, Q12H [address]    Continuous IV Infusions:  amiodarone, 1 mg/min, Intravenous, Continuous  lidocaine in dextrose 5%, 1 mg/min, Intravenous, Continuous  phenylephine,  mcg/min, Intravenous, Titrated    PRN Meds:  acetaminophen, 650 mg, Per NG Tube, Q6H PRN  albuterol, 2 5 mg, Nebulization, Q4H PRN  fentanyl citrate (PF), 50 mcg, Intravenous, Q2H PRN      Network Utilization Review Department  ATTENTION: Please call with any questions or concerns to [phone] and carefully listen to the prompts so that you are directed to the right person  All voicemails are confidential   Yris Castillo all requests for admission clinical reviews, approved or denied determinations and any other requests to dedicated fax number below belonging to the campus where the patient is receiving treatment   List of dedicated fax numbers for the Facilities:  [address] DENIALS (Administrative/Medical Necessity) [phone]   [address] (Maternity/NICU/Pediatrics) [phone]   [address] [place] [address] [address] [place] [phone] [id number] [place] [address] [address] [facility] [place] [address] [phone]

## 2021-05-17 NOTE — PLAN OF CARE
Problem: Potential for Falls  Goal: Patient will remain free of falls  Description: INTERVENTIONS:  - Assess patient frequently for physical needs  -  Identify cognitive and physical deficits and behaviors that affect risk of falls  -  [place] fall precautions as indicated by assessment   - Educate patient/family on patient safety including physical limitations  - Instruct patient to call for assistance with activity based on assessment  - Modify environment to reduce risk of injury  - Consider OT/PT consult to assist with strengthening/mobility  Outcome: Progressing     Problem: Prexisting or High Potential for Compromised Skin Integrity  Goal: Skin integrity is maintained or improved  Description: INTERVENTIONS:  - Identify patients at risk for skin breakdown  - Assess and monitor skin integrity  - Assess and monitor nutrition and hydration status  - Monitor labs   - Assess for incontinence   - Turn and reposition patient  - Assist with mobility/ambulation  - Relieve pressure over bony prominences  - Avoid friction and shearing  - Provide appropriate hygiene as needed including keeping skin clean and dry  - Evaluate need for skin moisturizer/barrier cream  - Collaborate with interdisciplinary team   - Patient/family teaching  - Consider wound care consult   Outcome: Progressing     Problem: Nutrition/Hydration-ADULT  Goal: Nutrient/Hydration intake appropriate for improving, restoring or maintaining nutritional needs  Description: Monitor and assess patient's nutrition/hydration status for malnutrition  Collaborate with interdisciplinary team and initiate plan and interventions as ordered  Monitor patient's weight and dietary intake as ordered or per policy  Utilize nutrition screening tool and intervene as necessary  Determine patient's food preferences and provide high-protein, high-caloric foods as appropriate       INTERVENTIONS:  - Monitor oral intake, urinary output, labs, and treatment plans  - Assess nutrition and hydration status and recommend course of action  - Evaluate amount of meals eaten  - Assist patient with eating if necessary   - Allow adequate time for meals  - Recommend/ encourage appropriate diets, oral nutritional supplements, and vitamin/mineral supplements  - Order, calculate, and assess calorie counts as needed  - Recommend, monitor, and adjust tube feedings and TPN/PPN based on assessed needs  - Assess need for intravenous fluids  - Provide specific nutrition/hydration education as appropriate  - Include patient/family/caregiver in decisions related to nutrition  Outcome: Progressing     Problem: SAFETY,RESTRAINT: NV/NON-SELF DESTRUCTIVE BEHAVIOR  Goal: Remains free of harm/injury (restraint for non violent/non self-detsructive behavior)  Description: INTERVENTIONS:  - Instruct patient/family regarding restraint use   - Assess and monitor physiologic and psychological status   - Provide interventions and comfort measures to meet assessed patient needs   - Identify and implement measures to help patient regain control  - Assess readiness for release of restraint   Outcome: Progressing  Goal: Returns to optimal restraint-free functioning  Description: INTERVENTIONS:  - Assess the patient's behavior and symptoms that indicate continued need for restraint  - Identify and implement measures to help patient regain control  - Assess readiness for release of restraint   Outcome: Progressing     Problem: PAIN - ADULT  Goal: Verbalizes/displays adequate comfort level or baseline comfort level  Description: Interventions:  - Encourage patient to monitor pain and request assistance  - Assess pain using appropriate pain scale  - Administer analgesics based on type and severity of pain and evaluate response  - Implement non-pharmacological measures as appropriate and evaluate response  - Consider cultural and social influences on pain and pain management  - Notify physician/advanced practitioner if interventions unsuccessful or patient reports new pain  Outcome: Progressing     Problem: INFECTION - ADULT  Goal: Absence or prevention of progression during hospitalization  Description: INTERVENTIONS:  - Assess and monitor for signs and symptoms of infection  - Monitor lab/diagnostic results  - Monitor all insertion sites, i e  indwelling lines, tubes, and drains  - Monitor endotracheal if appropriate and nasal secretions for changes in amount and color  - [place] appropriate cooling/warming therapies per order  - Administer medications as ordered  - Instruct and encourage patient and family to use good hand hygiene technique  - Identify and instruct in appropriate isolation precautions for identified infection/condition  Outcome: Progressing  Goal: Absence of fever/infection during neutropenic period  Description: INTERVENTIONS:  - Monitor WBC    Outcome: Progressing     Problem: SAFETY ADULT  Goal: Patient will remain free of falls  Description: INTERVENTIONS:  - Assess patient frequently for physical needs  -  Identify cognitive and physical deficits and behaviors that affect risk of falls    -  [place] fall precautions as indicated by assessment   - Educate patient/family on patient safety including physical limitations  - Instruct patient to call for assistance with activity based on assessment  - Modify environment to reduce risk of injury  - Consider OT/PT consult to assist with strengthening/mobility  Outcome: Progressing  Goal: Maintain or return to baseline ADL function  Description: INTERVENTIONS:  -  Assess patient's ability to carry out ADLs; assess patient's baseline for ADL function and identify physical deficits which impact ability to perform ADLs (bathing, care of mouth/teeth, toileting, grooming, dressing, etc )  - Assess/evaluate cause of self-care deficits   - Assess range of motion  - Assess patient's mobility; develop plan if impaired  - Assess patient's need for assistive devices and provide as appropriate  - Encourage maximum independence but intervene and supervise when necessary  - Involve family in performance of ADLs  - Assess for home care needs following discharge   - Consider OT consult to assist with ADL evaluation and planning for discharge  - Provide patient education as appropriate  Outcome: Progressing  Goal: Maintain or return mobility status to optimal level  Description: INTERVENTIONS:  - Assess patient's baseline mobility status (ambulation, transfers, stairs, etc )    - Identify cognitive and physical deficits and behaviors that affect mobility  - Identify mobility aids required to assist with transfers and/or ambulation (gait belt, sit-to-stand, lift, walker, cane, etc )  - [place] fall precautions as indicated by assessment  - Record patient progress and toleration of activity level on Mobility SBAR; progress patient to next Phase/Stage  - Instruct patient to call for assistance with activity based on assessment  - Consider rehabilitation consult to assist with strengthening/weightbearing, etc   Outcome: Progressing     Problem: DISCHARGE PLANNING  Goal: Discharge to home or other facility with appropriate resources  Description: INTERVENTIONS:  - Identify barriers to discharge w/patient and caregiver  - Arrange for needed discharge resources and transportation as appropriate  - Identify discharge learning needs (meds, wound care, etc )  - Arrange for interpretive services to assist at discharge as needed  - Refer to Case Management Department for coordinating discharge planning if the patient needs post-hospital services based on physician/advanced practitioner order or complex needs related to functional status, cognitive ability, or social support system  Outcome: Progressing     Problem: Knowledge Deficit  Goal: Patient/family/caregiver demonstrates understanding of disease process, treatment plan, medications, and discharge instructions  Description: Complete learning assessment and assess knowledge base    Interventions:  - Provide teaching at level of understanding  - Provide teaching via preferred learning methods  Outcome: Progressing     Problem: CARDIOVASCULAR - ADULT  Goal: Maintains optimal cardiac output and hemodynamic stability  Description: INTERVENTIONS:  - Monitor I/O, vital signs and rhythm  - Monitor for S/S and trends of decreased cardiac output  - Administer and titrate ordered vasoactive medications to optimize hemodynamic stability  - Assess quality of pulses, skin color and temperature  - Assess for signs of decreased coronary artery perfusion  - Instruct patient to report change in severity of symptoms  Outcome: Progressing  Goal: Absence of cardiac dysrhythmias or at baseline rhythm  Description: INTERVENTIONS:  - Continuous cardiac monitoring, vital signs, obtain 12 lead EKG if ordered  - Administer antiarrhythmic and heart rate control medications as ordered  - Monitor electrolytes and administer replacement therapy as ordered  Outcome: Progressing     Problem: RESPIRATORY - ADULT  Goal: Achieves optimal ventilation and oxygenation  Description: INTERVENTIONS:  - Assess for changes in respiratory status  - Assess for changes in mentation and behavior  - Position to facilitate oxygenation and minimize respiratory effort  - Oxygen administered by appropriate delivery if ordered  - Initiate smoking cessation education as indicated  - Encourage broncho-pulmonary hygiene including cough, deep breathe, Incentive Spirometry  - Assess the need for suctioning and aspirate as needed  - Assess and instruct to report SOB or any respiratory difficulty  - Respiratory Therapy support as indicated  Outcome: Progressing

## 2021-05-17 NOTE — ED NOTES
2mg versed administered as per Dr Brock Varela at this time     Carmelo Santana, [facility]  05/16/21 1886

## 2021-05-17 NOTE — CASE MANAGEMENT
Pt is not a <30 day readmission or current bundle  Pt intubated  CM placed call to pt's emergency contact spouse Tyler Duke to introduce self/CM role and begin discharge planning  Pt lives with spouse in a 2 story house that has 0 YENNY  Pt independent with ADLs PTA  Ambulating independently  No DME  Pt was driving prior to admission  No history of VNA, STR, inpatient MH treatment or D/A treatment per spouse  Pt has hx of outpatient cardiac rehab with [facility]  Pharmacy: [facility] [address]  PCP: [facility]  AD/LW/POA: None and declined information    CM to follow for d/c planning  CM reviewed d/c planning process including the following: identifying help at home, patient preference for d/c planning needs, Discharge Lounge, [facility] Meds to Bed program, availability of treatment team to discuss questions or concerns patient and/or family may have regarding understanding medications and recognizing signs and symptoms once discharged  CM also encouraged patient to follow up with all recommended appointments after discharge  Patient advised of importance for patient and family to participate in managing patients medical well being

## 2021-05-17 NOTE — ED NOTES
2g magnesium administered as per Dr Chely Jernigan at this time     Rocio Angulo, [facility]  05/16/21 5720

## 2021-05-17 NOTE — CONSULTS
Consultation - Electrophysiology-Cardiology (EP)   Kamron Cheek 80 y o  female MRN: [id number]  Unit/Bed#: [facility] 518-01 Encounter: [id number]      Consults    Assessment/Plan     Assessment:  1  VT with appropriate ICD therapy as well as successful external shocks   A ) [employer] single chamber ICD in situ   B ) maintained on amiodarone 200 mg daily and Toprol 25 mg daily as an outpatient  2  Prior cardiac arrest 1/2020  3  CAD status post JESSIKA to D1, RPL and mRCA 9/2019, maintained on Brilinta and aspirin   A ) known residual left circumflex disease   4  Ischemic cardiomyopathy with LVEF 25% per echo this admission   A ) severe hypokinesis of the mid-apical anterior, mid anteroseptal, and apical wall   B ) maintained on Entresto and Toprol as an outpatient  5  Chronic HFrEF  6  Hypertension  7  Hyperlipidemia  8  Diabetes mellitus type 2      Plan:  She is maintaining sinus rhythm on her current regimen  A thorough device interrogation was performed, which showed episodes of VT with appropriate ICD therapy  It seemed that the inciting event on 05/16 actually did not receive ICD therapy, likely due to device settings at that time  We thus changed the arrhythmia duration from 30 seconds to 10 seconds to try to treat more aggressively  In terms of her medications, recommend starting amiodarone 40 mg b i d  In addition to remain the on IV amiodarone  Her IV the lidocaine will be decreased, and esmolol will be discontinued at this time  We will try to increase her metoprolol to 25 mg b i d , and will also start mexiletine 150 mg t i d  So that her lidocaine can eventually be discontinued  We will need to trend LFTs in the setting of mexiletine use  Will continue to follow closely throughout her stay   was left at bedside in case device reprogramming is warranted  We can discuss further treatment plans as she becomes more clinically stable    Appreciate general cardiology input given her known CAD and prior catheterization results  History of Present Illness   Physician Requesting Consult: Russ Smiley MD  Reason for Consult / Principal Problem:  Recurrent VT    HPI: Tabitha Mace is a 80y o  year old female with VT maintained on amiodarone, she CAD status post prior JESSIKA to D1, RPL, and mid RCA maintained on Brilinta and aspirin dual antiplatelet therapy, ischemic cardiomyopathy with most recent LVEF 25%, chronic HFrEF, hypertension, hyperlipidemia, and diabetes  She typically follows with Dr Lakisha Alexandre as an outpatient  Upon review of outpatient records, she has multivessel CAD with prior JESSIKA to D1, RPL, and mid RCA  She also had balloon angioplasty to the left circumflex ostium and RPDA ostium  That report suggested consideration of a staged PCI to left circumflex and OM in the future if felt necessary  She then reportedly had a cardiac arrest while undergoing cardiac rehabilitation, and underwent [employer] single-chamber ICD implantation at that time  She has a known ischemic cardiomyopathy with improvement in LVEF from 25% to 45% per outpatient records, however was found have a large area of scar which was likely driving her episodes of VT  When she continued to have VT/VF, she was started on amiodarone antiarrhythmic therapy which reportedly suppressed her ongoing episodes  Prior to admission she had a syncopal episode resulting in a fall with head laceration  EMS report was reviewed, and noted that the patient admitted to feeling dizzy/lightheaded at which time she was noted to be in VT and required external defibrillation  She continued to have sustained arrhythmias even in the emergency room, and per H&P she had a total of 42 shocks  Eventually, a magnet was placed over her ICD so that her rhythm could be monitor closely and only external shock delivered if felt necessary  An echocardiogram was performed, which showed reduction in LVEF to 25%    She was placed on amiodarone as well as lidocaine  As episodes continued, she was eventually intubated and sedated, and also started on esmolol  EP has now been asked to see her in consultation for ongoing management of her ventricular arrhythmias  She is currently intubated but alert, is able to write down answers to questions  Review of Systems  ROS as noted above, otherwise 12 point review of systems was performed and is negative  Historical Information   Past Medical History:   Diagnosis Date    CHF (congestive heart failure) ([facility] [address] )     Coronary artery disease      History reviewed  No pertinent surgical history  Social History     Substance and Sexual Activity   Alcohol Use Not Currently     Social History     Substance and Sexual Activity   Drug Use Not Currently     Social History     Tobacco Use   Smoking Status Never Smoker   Smokeless Tobacco Never Used     Family History: History reviewed  No pertinent family history      Meds/Allergies   Hospital Medications:   Current Facility-Administered Medications   Medication Dose Route Frequency    acetaminophen (TYLENOL) oral suspension 650 mg  650 mg Per NG Tube Q6H PRN    albuterol inhalation solution 2 5 mg  2 5 mg Nebulization Q4H PRN    amiodarone (CORDARONE) 900 mg in dextrose 5 % 500 mL infusion  1 mg/min Intravenous Continuous    amiodarone tablet 400 mg  400 mg Oral BID    aspirin chewable tablet 81 mg  81 mg Per NG Tube Daily    atorvastatin (LIPITOR) tablet 80 mg  80 mg Per NG Tube Daily With Dinner    chlorhexidine (PERIDEX) 0 12 % oral rinse 15 mL  15 mL Mouth/Throat Q12H [address]    esmolol (BREVIBLOC) 2500 mg/250 mL IV infusion (premix)   mcg/kg/min Intravenous Titrated    ezetimibe (ZETIA) tablet 10 mg  10 mg Per NG Tube Daily    fentaNYL 1000 mcg in sodium chloride 0 9% 100mL infusion  50 mcg/hr Intravenous Continuous    fentanyl citrate (PF) 100 MCG/2ML 50 mcg  50 mcg Intravenous Q2H PRN    heparin (porcine) subcutaneous injection 5,000 Units  5,000 Units Subcutaneous Q8H [address]    insulin lispro (HumaLOG) 100 units/mL subcutaneous injection 1-5 Units  1-5 Units Subcutaneous Q6H [address]    lidocaine 2000 mg in 250 mL infusion (cardiac premix)- NOT FOR TREATMENT OF PAIN  2 mg/min Intravenous Continuous    metoprolol tartrate (LOPRESSOR) tablet 25 mg  25 mg Oral Q12H [address]    mexiletine (MEXITIL) capsule 150 mg  150 mg Oral Q8H [address]    pantoprazole (PROTONIX) injection 40 mg  40 mg Intravenous Q24H MARI    phenylephrine (AKIL-SYNEPHRINE) 50 mg (STANDARD CONCENTRATION) in sodium chloride 0 9% 250 mL   mcg/min Intravenous Titrated    propofol (DIPRIVAN) 1000 mg in 100 mL infusion (premix)  5-50 mcg/kg/min Intravenous Titrated    tetanus-diphtheria-acellular pertussis (BOOSTRIX) IM injection 0 5 mL  0 5 mL Intramuscular Once    ticagrelor tablet 60 mg  60 mg Per NG Tube Q12H [address]     Home Medications:   Medications Prior to Admission   Medication    amiodarone 200 mg tablet    aspirin 81 mg chewable tablet    atorvastatin (LIPITOR) 80 mg tablet    Empagliflozin 10 MG TABS    ezetimibe (ZETIA) 10 mg tablet    metFORMIN (GLUCOPHAGE) 1000 MG tablet    metoprolol succinate (TOPROL-XL) 25 mg 24 hr tablet    sacubitril-valsartan (Entresto) 24-26 MG TABS    spironolactone (ALDACTONE) 25 mg tablet    ticagrelor 60 MG       No Known Allergies    Objective   Vitals: Blood pressure 113/61, pulse (!) 50, temperature 100 °F (37 8 °C), temperature source Bladder, resp  rate 12, height 5' 7" (1 702 m), weight 70 6 kg (155 lb 10 3 oz), SpO2 99 %    Orthostatic Blood Pressures      Most Recent Value   Blood Pressure  113/61 filed at 05/17/2021 1200   Patient Position - Orthostatic VS  Sitting filed at 05/16/2021 2045            Intake/Output Summary (Last 24 hours) at 5/17/2021 1252  Last data filed at 5/17/2021 1200  Gross per 24 hour   Intake 1744 77 ml   Output 1685 ml   Net 59 77 ml       Invasive Devices     Central Venous Catheter Line CVC Central Lines 05/16/21 Triple 1 day          Peripheral Intravenous Line            Peripheral IV 05/16/21 Left Forearm less than 1 day    Peripheral IV 05/16/21 Right Antecubital less than 1 day    Peripheral IV 05/16/21 Right Arm less than 1 day          Arterial Line            Arterial Line 05/16/21 Right Radial less than 1 day          Drain            NG/OG/Enteral Tube Orogastric 18 Fr less than 1 day    Urethral Catheter Temperature probe less than 1 day          Airway            ETT  7 5 mm less than 1 day                Physical Exam   GEN: NAD, intubated but alert  SKIN: dry without significant lesions or rashes  HEENT: NCAT, PERRL, EOMs intact  NECK: No JVD appreciated  CARDIOVASCULAR: RRR, normal S1, S2 without murmurs, rubs, or gallops appreciated  LUNGS: Clear to auscultation bilaterally anteriorly without wheezes, rhonchi, or rales  ABDOMEN: Soft, nontender, nondistended  EXTREMITIES/VASCULAR: perfused without clubbing, cyanosis, or edema b/l  PSYCH: Normal mood and affect  NEURO: CN ll-Xll grossly intact, follows commands        Lab Results: I have personally reviewed pertinent lab results  Results from last 7 days   Lab Units 05/17/21  0507 05/16/21  2355 05/16/21 2319  05/16/21  1743   WBC Thousand/uL 16 13* 17 64*  --   --  14 29*   HEMOGLOBIN g/dL 12 1 12 5  --   --  13 6   I STAT HEMOGLOBIN g/dl  --   --  12 9   < >  --    HEMATOCRIT % 37 3 39 4  --   --  42 6   HEMATOCRIT, ISTAT %  --   --  38   < >  --    PLATELETS Thousands/uL 303 274  --   --  322    < > = values in this interval not displayed       Results from last 7 days   Lab Units 05/17/21  0507 05/16/21  2355 05/16/21  2319 05/16/21  2123   POTASSIUM mmol/L 5 2 4 5  --  5 6*   CHLORIDE mmol/L 110* 109*  --  111*   CO2 mmol/L 20* 17*  --  21   CO2, I-STAT mmol/L  --   --  19*  --    BUN mg/dL 38* 38*  --  37*   CREATININE mg/dL 1 44* 1 48*  --  1 53*   GLUCOSE, ISTAT mg/dl  --   --  215*  --    CALCIUM mg/dL 10 3* 11 1*  -- 10 2*     Results from last 7 days   Lab Units 21  2355   INR  1 06     Results from last 7 days   Lab Units 21  0507 21  2355 21  2123   MAGNESIUM mg/dL 4 1* 5 4* 2 8*       Imaging: I have personally reviewed pertinent reports  ECHO:   Results for orders placed during the hospital encounter of 21   Echo complete with contrast if indicated    Narrative [address]  [facility] [place] [address] [facility]  [phone]    Transthoracic Echocardiogram  2D, M-mode, Doppler, and Color Doppler    Study date:  17-May-2021    Patient: Nathanael Dwyer  MR number: [id number]  Account number: [de-identified]  : [id number]  Age: 80 years  Gender: Female  Status: Inpatient  Location: Bedside  Height: 67 in  Weight: 143 lb  BP: 135/ 66 mmHg    Indications: CM  Diagnoses: I42 9 - Cardiomyopathy, unspecified    Sonographer:  GERRY Falcon  Referring Physician:  Shauna Linton PA-C  Group:  [facility]  Cardiology Fellow:  Dinesh Alvarado DO  Interpreting Physician:  China Cisneros MD    SUMMARY    LEFT VENTRICLE:  The ventricle was mildly dilated  Systolic function was markedly reduced  Ejection fraction was estimated to be 25 %  There was moderate diffuse hypokinesis  There was severe hypokinesis of the mid-apical anterior, mid anteroseptal, and apical wall(s)  The changes were consistent with concentric remodeling (increased wall thickness with normal wall mass)  Features were consistent with a pseudonormal left ventricular filling pattern, with concomitant abnormal relaxation and increased filling pressure (grade 2 diastolic dysfunction)  Doppler parameters were consistent with elevated mean left atrial filling pressure  RIGHT VENTRICLE:  The size was normal   Systolic function was normal     MITRAL VALVE:  There was mild to moderate annular calcification  There was mild regurgitation      AORTIC VALVE:  There was no evidence for stenosis  TRICUSPID VALVE:  There was mild regurgitation  IVC, HEPATIC VEINS:  The inferior vena cava was mildly dilated  The respirophasic change in diameter was less than 50%  HISTORY: PRIOR HISTORY: CAD;CHF  PROCEDURE: The procedure was performed at the bedside  This was a routine study  The transthoracic approach was used  The study included complete 2D imaging, M-mode, complete spectral Doppler, and color Doppler  The heart rate was 110 bpm,  at the start of the study  This was a technically difficult study  LEFT VENTRICLE: The ventricle was mildly dilated  Systolic function was markedly reduced  Ejection fraction was estimated to be 25 %  There was moderate diffuse hypokinesis  There was severe hypokinesis of the mid-apical anterior, mid  anteroseptal, and apical wall(s)  Wall thickness was normal  The changes were consistent with concentric remodeling (increased wall thickness with normal wall mass)  DOPPLER: Features were consistent with a pseudonormal left ventricular  filling pattern, with concomitant abnormal relaxation and increased filling pressure (grade 2 diastolic dysfunction)  Doppler parameters were consistent with elevated mean left atrial filling pressure  RIGHT VENTRICLE: The size was normal  Systolic function was normal  Wall thickness was normal     LEFT ATRIUM: Size was normal     RIGHT ATRIUM: Size was normal  A pacing wire was present  MITRAL VALVE: There was mild to moderate annular calcification  There was normal leaflet separation  DOPPLER: The transmitral velocity was within the normal range  There was no evidence for stenosis  There was mild regurgitation  AORTIC VALVE: The valve was trileaflet  Leaflets exhibited mild to moderate calcification, normal cuspal separation, and sclerosis  DOPPLER: Transaortic velocity was within the normal range  There was no evidence for stenosis  There was no  regurgitation      TRICUSPID VALVE: The valve structure was normal  There was normal leaflet separation  DOPPLER: The transtricuspid velocity was within the normal range  There was no evidence for stenosis  There was mild regurgitation  Estimated peak PA  pressure was 45 mmHg  The findings suggest mild pulmonary hypertension  PULMONIC VALVE: Leaflets exhibited normal thickness, no calcification, and normal cuspal separation  DOPPLER: The transpulmonic velocity was within the normal range  There was trace regurgitation  PERICARDIUM: There was no pericardial effusion  The pericardium was normal in appearance  AORTA: The root exhibited normal size  SYSTEMIC VEINS: IVC: The inferior vena cava was normal in size and course  The inferior vena cava was mildly dilated  The respirophasic change in diameter was less than 50%      SYSTEM MEASUREMENT TABLES    2D  %FS: 30 77 %  Ao Diam: 2 39 cm  EDV(Teich): 154 23 ml  EF(Teich): 57 71 %  ESV(Teich): 65 23 ml  IVSd: 0 87 cm  LA Area: 14 86 cm2  LA Diam: 3 53 cm  LVEDV MOD A4C: 92 56 ml  LVEF MOD A4C: 47 %  LVESV MOD A4C: 49 06 ml  LVIDd: 5 61 cm  LVIDs: 3 88 cm  LVLd A4C: 7 45 cm  LVLs A4C: 6 22 cm  LVPWd: 0 87 cm  RA Area: 10 9 cm2  RVIDd: 2 72 cm  SV MOD A4C: 43 5 ml  SV(Teich): 89 01 ml    CW  TR Vmax: 2 78 m/s  TR maxP 98 mmHg    MM  TAPSE: 2 02 cm    PW  E' Sept: 0 03 m/s  E/E' Sept: 27 02  MV A [place]: 0 54 m/s  MV Dec [place]: 2 56 m/s2  MV DecT: 295 03 ms  MV E [place]: 0 76 m/s  MV E/A Ratio: 1 41  MV PHT: 85 56 ms  MVA By PHT: 2 57 cm2    [employer] Accredited Echocardiography Laboratory    Prepared and electronically signed by    Dot Hodges MD  Signed 17-May-2021 11:19:55         CATH/STRESS TEST:    EKG:         DEVICE INTERROGATION: VT with external cardioversion (while magnet was over device)          VT with no device therapy given - per notes shock was not attempted due to fail to reconfirm rhythm             VTE Prophylaxis: Heparin subQ

## 2021-05-17 NOTE — ED RE-EVALUATION NOTE
Called back to patients room  In recurrent VT  Multiple cardioversions carried out  Decision made to intubate to sedate to decrease cardiac irritability  Intubated first pass  Additional medications given prior to intubation and patient maintained in NSR  Central line placed  Patient went into VT again following central line placement  Total of 42 cardioversions delivered  Calcium, mag, insulin, dextrose given in even this is K related  VBG shows metabolic acidosis, vent settings increased  Family updated  Critical care updated  Decision to order esmolol as I suspect this is due to VT scar, not R-on-T so I do not suspect ispril will be of benefit here  Central line OK to use - CXR shows good position  Additional CC time: 42 minutes, separate from billable procedures, teaching time  Kimberli Perrin MD  Emergency and EMS Medicine       Marcy Guy MD  05/17/21 7466

## 2021-05-17 NOTE — PROGRESS NOTES
Critical Care Attending Progress Note:    Patient seen and assessed on 5/17 at 8:16 am     81 yo woman with known ischemic cardiomyopathy and prior VT episode 3/2021 admitted with refractory VT, likely scar mediated  She was intubated for airway protection in setting of multiple shocks and started on Amiodarone, Lidocaine, and Esmolol infusion  Ischemic Cardiomyopathy, LVEF 45% in 2020  Paroxysmal Ventricular Tachycardia  Hypotension, drug induced, POA  Syncope  Troponemia  Acute Kidney Injury, POA  Hyperkalemia, POA  Metabolic Acidosis, POA  Leukocytosis, reactive, POA  Hypertension  Hyperlipidemia  Diabetes Mellitus Type 2 (non insulin dependent)  Chronic Kidney Disease (baseline Cr 1 7)  Multivessel Coronary Artery Disease  H/o Drug Eluting Coronary Stents    Cath 2019:  PCI to Diagonal/Ramus, mid RCA, Ostial RPL, Balloon angioplasty to ostial LCx, Ostial RPDA    Viability Scan 9/2019  1  Only mild partial redistribution in small portion of distal posterolateral  wall while rest of posterolateral wall remains essentially fixed on the 24 hours  redistribution images, suggesting small area of viable ischemic myocardium      2  Fixed perfusion defects in apical, anteroseptal, and distal anterior wall as  described above, likely due to nonviable infarct/scar  No further VT episodes since admission to ICU  Patient following commands, comfortable in no distress on exam  Lungs are clear, Extremities with trace edema      Plan discussed with EP attending:  N: continue propofol and fentanyl for sedation, will hold and attempt SBT this afternoon if patient tolerates down-titration of antiarrhythmics, patient with small hyperdense area on anterior fax that could be subdural bleed, repeat CTH ordered    CV: start Mexitil 150 mg TID, Amiodarone 400 mg BID, resume home metoprolol 25 mg BID, d/c Esmolol infusion, downtitrate lidocaine to 1 mg/hr, continue amiodarone infusion, TTE pending, continue to trend troponins, will need further assessment if patient warrants further ischemic work up at this juncture or if we are continuing to manage as scar mediated VT, patient placed back on ASA/Brilinta, Liptor, Zetia    R: patient on minimal ventilator settings, no evidence of aspiration on imaging, SBT this afternoon with plan to extubate if hemodynamically stable    GI: OGT in situ, meds per OGT, bedside S&S after extubation, advance diet following this, PPI in interim, add bowel regimen    : vaughn in situ, monitor strict I&Os, base weight 65 3 kg per last outpatient visit, monitor daily weights, gentle diuresis as able    ID: no acute infectious issues at this time    Endo: ISS as needed for glucose management    H: HSQ now, marcos AMADOR time: 35 minutes  Rosa Elena Weiss MD  Critical Care Attending

## 2021-05-17 NOTE — PLAN OF CARE
Problem: Potential for Falls  Goal: Patient will remain free of falls  Description: INTERVENTIONS:  - Assess patient frequently for physical needs  -  Identify cognitive and physical deficits and behaviors that affect risk of falls    -  [place] fall precautions as indicated by assessment   - Educate patient/family on patient safety including physical limitations  - Instruct patient to call for assistance with activity based on assessment  - Modify environment to reduce risk of injury  - Consider OT/PT consult to assist with strengthening/mobility  5/17/2021 0303 by Pedro Robin RN  Outcome: Progressing  5/17/2021 0302 by Pedro Robin RN  Outcome: Progressing     Problem: Prexisting or High Potential for Compromised Skin Integrity  Goal: Skin integrity is maintained or improved  Description: INTERVENTIONS:  - Identify patients at risk for skin breakdown  - Assess and monitor skin integrity  - Assess and monitor nutrition and hydration status  - Monitor labs   - Assess for incontinence   - Turn and reposition patient  - Assist with mobility/ambulation  - Relieve pressure over bony prominences  - Avoid friction and shearing  - Provide appropriate hygiene as needed including keeping skin clean and dry  - Evaluate need for skin moisturizer/barrier cream  - Collaborate with interdisciplinary team   - Patient/family teaching  - Consider wound care consult   Outcome: Progressing

## 2021-05-17 NOTE — RESPIRATORY THERAPY NOTE
RT Ventilator Management Note  Krystian Collado 80 y o  female MRN: [id number]  Unit/Bed#: [facility] 518-01 Encounter: [id number]      Daily Screen     No data found in the last 10 encounters  Physical Exam:   Assessment Type: Assess only  General Appearance: Sedated  Respiratory Pattern: Assisted  Chest Assessment: Chest expansion symmetrical  Bilateral Breath Sounds: Clear      Resp Comments: Pt tolerating ac/vc settings well  Oxygen was lowered to 80% by pa  No respiratory distress noted   Will continue to moniter pt per protocol

## 2021-05-17 NOTE — RESPIRATORY THERAPY NOTE
RT Protocol Note  Shanti Boyle 80 y o  female MRN: [id number]  Unit/Bed#: [facility] 518-01 Encounter: [id number]    Assessment    Principal Problem:    Ventricular tachycardia ([facility] [address] )  Active Problems:    Heart failure (HCC)    Hyperlipidemia    CAD (coronary artery disease)    Type 2 diabetes mellitus (HCC)    Hyperkalemia    YOLIS (acute kidney injury) ([facility] [address] )    Metabolic acidosis    Scalp laceration      Home Pulmonary Medications:  NA       Past Medical History:   Diagnosis Date    [facility]congest[facility]) ([facility])     Coronary artery disease      Social History     Socioeconomic History    Marital status: /Civil Union     Spouse name: None    Number of children: None    Years of education: None    Highest education level: None   Occupational History    None   Social Needs    Financial resource strain: None    Food insecurity     Worry: None     Inability: None    Transportation needs     Medical: None     Non-medical: None   Tobacco Use    Smoking status: Never Smoker    Smokeless tobacco: Never Used   Substance and Sexual Activity    Alcohol use: Not Currently    Drug use: Not Currently    Sexual activity: None   Lifestyle    Physical activity     Days per week: None     Minutes per session: None    Stress: None   Relationships    Social connections     Talks on phone: None     Gets together: None     Attends [employer] service: None     Active member of club or organization: None     Attends meetings of clubs or organizations: None     Relationship status: None    Intimate partner violence     Fear of current or ex partner: None     Emotionally abused: None     Physically abused: None     Forced sexual activity: None   Other Topics Concern    None   Social History Narrative    None       Subjective         Objective    Physical Exam:   Assessment Type: (P) Assess only  General Appearance: (P) Sedated  Respiratory Pattern: (P) Assisted  Chest Assessment: (P) Chest expansion symmetrical  Bilateral Breath Sounds: (P) Clear    Vitals:  Blood pressure 148/68, pulse (!) 54, temperature 97 5 °F (36 4 °C), resp  rate (!) 24, height 5' 7" (1 702 m), weight 65 2 kg (143 lb 11 8 oz), SpO2 100 %  Results from last 7 days   Lab Units 05/16/21  2358   PH ART  7 347*   PCO2 ART mm Hg 22 6*   PO2 ART mm Hg 276 7*   HCO3 ART mmol/L 12 1*   BASE EXC ART mmol/L -11 5   O2 CONTENT ART mL/dL 18 6   O2 HGB, ARTERIAL % 98 4*   ABG SOURCE  Line, Arterial       Imaging and other studies: I have personally reviewed pertinent reports  Plan    Respiratory Plan: (P) Vent/NIV/HFNC        Resp Comments: (P) Pt is 81 y/o female admitted s/p code  Pt evaluated per respiraotry protocol  Pt is on vent, not in any respiratory distress  Breathe sounds are clear  No home respiratory medications used at home  No intervention started at this time

## 2021-05-17 NOTE — CONSULTS
Cardiology Consult H&P  Dana Grant 80 y o  female MRN: [id number]  Unit/Bed#: [facility] 221-62 Encounter: [id number]  Physician Requesting Consult: Miriam August MD  Reason for Consult: VT storm    HPI  68yo woman with history of MVCD, DM2, ICM and HFrEF (reported 30%) s/p BS ICD who presented with syncope and fall  While in ED and in ICU she had multiple recurrences of VT  ICD interogation shows VT going at rate of 190bpm and started around 4:30pm  There was initial question of ICD innapropriately shocking so magnet placed on patient but this continued  QTc on ECG around 500  She was given 150mg IV amio, started on lidocaine gtt  Despite this continued to have recurrent VT  Eventually intubated and started on esmolol gtt, propofol for sedation  This seemed to control her VT for now  Patient's  reports follows at [facility] and wasn't candidate for further [facility]  Lyes on admission pertinent for hyperkalemia 5 and 5 6 on repeat  A&P  1  VT storm  - Maintain sedation with propofol  - Continue lidocaine, amio, esmolol and pressors for hemodynamic support as needed  - EP to see patient later today  [employer] interrogation device at bedside  - If continues to break through can try propanolol    2  HLD: atorvastatin 80mg, ezetimibe 10mg    3  CAD: aspirin 81mg daily, ticagrelor 60mg q12H    4  DM2  Leobardo Noble MD  - PGY-5 Cardiology Fellow  - [place] text enabled      ======================================================    Physical exam  Vitals: Blood pressure 108/58, pulse 86, temperature 97 7 °F (36 5 °C), temperature source Tympanic, resp  rate 18, height 5' 7" (1 702 m), weight 65 2 kg (143 lb 11 8 oz), SpO2 97 %  Gen: well appearing  Psych: AOx3  Skin: intact  Cardiac: S1, S2, regular rate, no S3 or S4 appreciated  No murmurs  +2 PT, radial pulses  No peripheral edema No carotid bruits  Resp: CTABL  No crackles  MSK: 5/5 strength throughout muscle groups  Neuro: CN grossly intact   Sensory to light touch, pain, proprioception intact BL LE, UE  LN: no cervical LAD  Rheum: no joint deformities in UE or LE    ======================================================    TREADMILL STRESS  No results found for this or any previous visit    ----------------------------------------------------------------------------------------------  NUCLEAR STRESS TEST: No results found for this or any previous visit  No results found for this or any previous visit     --------------------------------------------------------------------------------  CATH:  No results found for this or any previous visit   --------------------------------------------------------------------------------  ECHO:   No results found for this or any previous visit  No results found for this or any previous visit   --------------------------------------------------------------------------------  HOLTER  No results found for this or any previous visit   --------------------------------------------------------------------------------  CAROTIDS  No results found for this or any previous visit  [unfilled]   ======================================================      Review of Systems  ROS as noted above, otherwise 12 point review of systems was performed and is negative  Historical Information   Past Medical History:   Diagnosis Date    CHF (congestive heart failure) ([facility] [address] )     Coronary artery disease      History reviewed  No pertinent surgical history  Social History     Substance and Sexual Activity   Alcohol Use Not Currently     Social History     Substance and Sexual Activity   Drug Use Not Currently     Social History     Tobacco Use   Smoking Status Never Smoker   Smokeless Tobacco Never Used     Family History: History reviewed  No pertinent family history      Meds/Allergies   Hospital Medications:   Current Facility-Administered Medications   Medication Dose Route Frequency    acetaminophen (TYLENOL) oral suspension 650 mg  650 mg Per NG Tube Q6H PRN    amiodarone (CORDARONE) 900 mg in dextrose 5 % 500 mL infusion  1 mg/min Intravenous Continuous    aspirin chewable tablet 81 mg  81 mg Per NG Tube Daily    atorvastatin (LIPITOR) tablet 80 mg  80 mg Per NG Tube Daily With Dinner    chlorhexidine (PERIDEX) 0 12 % oral rinse 15 mL  15 mL Mouth/Throat Q12H [address]    dextrose 50 % IV solution **ADS Override Pull**        esmolol (BREVIBLOC) 2500 mg/250 mL IV infusion (premix)   mcg/kg/min Intravenous Titrated    ezetimibe (ZETIA) tablet 10 mg  10 mg Per NG Tube Daily    fentaNYL 1000 mcg in sodium chloride 0 9% 100mL infusion  50 mcg/hr Intravenous Continuous    fentanyl citrate (PF) 100 MCG/2ML **ADS Override Pull**        fentanyl citrate (PF) 100 MCG/2ML 50 mcg  50 mcg Intravenous Q2H PRN    insulin lispro (HumaLOG) 100 units/mL subcutaneous injection 1-5 Units  1-5 Units Subcutaneous Q6H [address]    lidocaine 2000 mg in 250 mL infusion (cardiac premix)- NOT FOR TREATMENT OF PAIN  4 mg/min Intravenous Continuous    midazolam (VERSED) 2 mg/2 mL injection **ADS Override Pull**        midazolam (VERSED) 2 mg/2 mL injection **ADS Override Pull**        norepinephrine (LEVOPHED) 1 mg/mL injection **ADS Override Pull**        phenylephrine (AKIL-SYNEPHRINE) 50 mg (STANDARD CONCENTRATION) in sodium chloride 0 9% 250 mL   mcg/min Intravenous Titrated    propofol (DIPRIVAN) 1000 mg in 100 mL infusion (premix)  5-50 mcg/kg/min Intravenous Titrated    tetanus-diphtheria-acellular pertussis (BOOSTRIX) IM injection 0 5 mL  0 5 mL Intramuscular Once    ticagrelor tablet 60 mg  60 mg Per NG Tube Q12H [address]     Home Medications:   Medications Prior to Admission   Medication    amiodarone 200 mg tablet    aspirin 81 mg chewable tablet    atorvastatin (LIPITOR) 80 mg tablet    Empagliflozin 10 MG TABS    ezetimibe (ZETIA) 10 mg tablet    metFORMIN (GLUCOPHAGE) 1000 MG tablet    metoprolol succinate (TOPROL-XL) 25 mg 24 hr tablet    sacubitril-valsartan (Entresto) 24-26 MG TABS    spironolactone (ALDACTONE) 25 mg tablet    ticagrelor 60 MG       No Known Allergies    Objective   Vitals: Blood pressure 108/58, pulse 86, temperature 97 7 °F (36 5 °C), temperature source Tympanic, resp  rate 18, height 5' 7" (1 702 m), weight 65 2 kg (143 lb 11 8 oz), SpO2 97 %  Orthostatic Blood Pressures      Most Recent Value   Blood Pressure  108/58 filed at 05/16/2021 2200   Patient Position - Orthostatic VS  Sitting filed at 05/16/2021 2045            Intake/Output Summary (Last 24 hours) at 5/17/2021 0116  Last data filed at 5/16/2021 1915  Gross per 24 hour   Intake 250 ml   Output --   Net 250 ml       Invasive Devices     Central Venous Catheter Line            CVC Central Lines 05/16/21 Triple 1 day          Peripheral Intravenous Line            Peripheral IV 05/16/21 Left Forearm less than 1 day    Peripheral IV 05/16/21 Right Antecubital less than 1 day    Peripheral IV 05/16/21 Right Arm less than 1 day          Arterial Line            Arterial Line 05/16/21 Right Radial less than 1 day          Drain            NG/OG/Enteral Tube Orogastric 18 Fr less than 1 day          Airway            ETT  7 5 mm less than 1 day                Physical Exam    Lab Results: I have personally reviewed pertinent lab results      Results from last 7 days   Lab Units 05/16/21 2355 05/16/21 1744 05/16/21 1743   WBC Thousand/uL 17 64*  --  14 29*   HEMOGLOBIN g/dL 12 5  --  13 6   I STAT HEMOGLOBIN g/dl  --  15 0  --    HEMATOCRIT % 39 4  --  42 6   HEMATOCRIT, ISTAT %  --  44  --    PLATELETS Thousands/uL 274  --  322     Results from last 7 days   Lab Units 05/16/21 2355 05/16/21 2123 05/16/21  1744 05/16/21  1743   POTASSIUM mmol/L 4 5 5 6*  --  5 0   CHLORIDE mmol/L 109* 111*  --  104   CO2 mmol/L 17* 21  --  19*   CO2, I-STAT mmol/L  --   --  19*  --    BUN mg/dL 38* 37*  --  42*   CREATININE mg/dL 1 48* 1 53*  --  1 83*   GLUCOSE, ISTAT mg/dl  --   -- 168*  --    CALCIUM mg/dL 11 1* 10 2*  --  10 0     Results from last 7 days   Lab Units 05/16/21  2355   INR  1 06     Results from last 7 days   Lab Units 05/16/21  2355 05/16/21  2123   MAGNESIUM mg/dL 5 4* 2 8*

## 2021-05-17 NOTE — ED NOTES
2mg versed administered as per Dr Rosi Hassan at this time     Roseanne Colbert, Replaced by [facility]  05/16/21 7767

## 2021-05-17 NOTE — PROCEDURES
Laceration repair    Date/Time: 5/17/2021 3:29 AM  Performed by: Lucas Funez MD  Authorized by: Lucas Funez MD   Consent: Verbal consent obtained    Consent given by: patient (given in ED prior to intubation)  Body area: head/neck  Location details: scalp  Laceration length: 3 5 cm  Foreign bodies: no foreign bodies  Tendon involvement: none  Nerve involvement: none  Vascular damage: no    Sedation:  Patient sedated: yes  Analgesia: see MAR for details      Wound Dehiscence:  Superficial Wound Dehiscence: simple closure      Procedure Details:  Irrigation solution: saline  Amount of cleaning: extensive  Debridement: none  Degree of undermining: none  Skin closure: staples  Approximation difficulty: simple  Patient tolerance: patient tolerated the procedure well with no immediate complications

## 2021-05-17 NOTE — RESPIRATORY THERAPY NOTE
RT Ventilator Management Note  Shane Brandon 80 y o  female MRN: [id number]  Unit/Bed#: [facility] 518-01 Encounter: [id number]      Daily Screen       5/17/2021  0700             Patient safety screen outcome[de-identified]  Failed    Not Ready for Weaning due to[de-identified]  Underline problem not resolved            Physical Exam:   Assessment Type: (P) Assess only  General Appearance: (P) Eyes open/responds to stimulus  Respiratory Pattern: (P) Assisted  Chest Assessment: (P) Chest expansion symmetrical  Bilateral Breath Sounds: (P) Clear      Resp Comments: (P) Pt remains on AC settings and VS are currently stable  Fio2 decreased to 50%  Spo2 remains 100%  Will continue to monitor and titrate fio2 appropriately

## 2021-05-17 NOTE — ED NOTES
150mg lido administered as per Dr Robertson Po at this time     Samantha Sotomayor, [facility]  05/16/21 1120

## 2021-05-17 NOTE — ED PROCEDURE NOTE
Procedure  Intubation    Date/Time: 5/16/2021 11:31 PM  Performed by: Ezekiel Carlson MD  Authorized by: Ezekiel Carlson MD     Patient location:  ED  Other Assisting Provider: Yes (comment) (Dr Rosi Hassan)    Consent:     Consent obtained:  Emergent situation    Consent given by:  Spouse    Alternatives discussed:  No treatment  Universal protocol:     Patient identity confirmed:  Arm band  Pre-procedure details:     Patient status:  Altered mental status    Pretreatment medications:  Etomidate    Paralytics:  Vecuronium  Indications:     Indications for intubation: airway protection    Procedure details:     Preoxygenation:  Nonrebreather mask    CPR in progress: no      Intubation method:  Oral    Oral intubation technique:  Glidescope    Laryngoscope size: S3 hyperangulated  Tube size (mm):  7 5    Tube type:  Cuffed    Number of attempts:  1    Cricoid pressure: no      Tube visualized through cords: yes    Placement assessment:     ETT to lip:  23    ETT to teeth:  22    Tube secured with:  ETT masters    Breath sounds:  Equal and absent over the epigastrium    Placement verification: chest rise, condensation, direct visualization, equal breath sounds, tube exhalation and capnography    Central Line    Date/Time: 5/16/2021 11:43 PM  Performed by: Ezekiel Carlson MD  Authorized by: Ezekiel Carlson MD     Patient location:  ED  Other Assisting Provider: Yes (comment) (Dr Rosi Hassan, Dr Jf López)    Consent:     Consent obtained:  Emergent situation  Universal protocol:     Patient identity confirmed:  Arm band  Pre-procedure details:     Hand hygiene: Hand hygiene performed prior to insertion      Sterile barrier technique:  All elements of maximal sterile technique followed      Skin preparation:  2% chlorhexidine    Skin preparation agent: Skin preparation agent completely dried prior to procedure    Indications:     Central line indications: medications requiring central line and hemodynamic monitoring    Anesthesia (see MAR for exact dosages): Anesthesia method:  Local infiltration    Local anesthetic:  Lidocaine 1% WITH epi  Procedure details:     Location:  Right internal jugular    Vessel type: vein      Laterality:  Right    Approach: percutaneous technique used      Patient position:  Trendelenburg    Catheter type:  Triple lumen 16cm    Catheter size:  7 Fr    Landmarks identified: yes      Ultrasound guidance: yes      Ultrasound image availability:  Still images obtained (Images available in media section)    Manometry confirmation: no      Number of attempts:  1    Successful placement: yes    Post-procedure details:     Post-procedure:  Dressing applied and line sutured    Assessment:  Blood return through all ports, no pneumothorax on x-ray, free fluid flow and placement verified by x-ray    Post-procedure complications: none      Patient tolerance of procedure:   Tolerated well, no immediate complications                     Casandra Felipe MD  05/17/21 4212

## 2021-05-18 PROBLEM — I25.10 CAD (CORONARY ARTERY DISEASE): Chronic | Status: ACTIVE | Noted: 2021-01-01

## 2021-05-18 PROBLEM — E11.9 TYPE 2 DIABETES MELLITUS (HCC): Chronic | Status: ACTIVE | Noted: 2021-01-01

## 2021-05-18 PROBLEM — E87.5 HYPERKALEMIA: Status: RESOLVED | Noted: 2021-01-01 | Resolved: 2021-01-01

## 2021-05-18 PROBLEM — E87.2 METABOLIC ACIDOSIS: Status: RESOLVED | Noted: 2021-01-01 | Resolved: 2021-01-01

## 2021-05-18 PROBLEM — I50.22 CHRONIC SYSTOLIC HEART FAILURE (HCC): Chronic | Status: ACTIVE | Noted: 2021-01-01

## 2021-05-18 PROBLEM — I50.22 CHRONIC SYSTOLIC HEART FAILURE (HCC): Status: ACTIVE | Noted: 2021-01-01

## 2021-05-18 PROBLEM — E78.5 HYPERLIPIDEMIA: Chronic | Status: ACTIVE | Noted: 2021-01-01

## 2021-05-18 NOTE — PROGRESS NOTES
Cardiology Progress Note - Chris Chen 80 y o  female MRN: [id number]    Unit/Bed#: [facility] 518-01 Encounter: [id number]      Assessment & Plan:  Principal Problem:    Ventricular tachycardia ([facility] [address] )  Active Problems:    Heart failure ([facility] [address] )    Hyperlipidemia    CAD (coronary artery disease)    Type 2 diabetes mellitus (HCC)    Hyperkalemia    YOLIS (acute kidney injury) ([facility] [address] )    Metabolic acidosis    Scalp laceration    # VT storm  # multivessel CAD   - Cath 2019: PCI to Diagonal/Ramus, mid RCA, Ostial RPL, Balloon angioplasty to ostial LCx, Ostial RPDA, residual circ disease  - viability in 2019 showed a fixed far in LAD distribution territory and a partially reversible territory in the RCA distribution  Likely scar mediated reentry  - As for EP patient is now on p o  Amnio, mexiletine and Lopressor   - no new episodes of VT  - patient now extubated and weaned off drips  - continue with aspirin 81 mg q d  And atorvastatin 80 mg q d  For CAD  - continue Brilinta 60 mg twice daily    # HFrEF  - drop in EF to 25% with severe LAD distribution wall motion abnormalities  - continue Lopressor 25 mg q 12  - Entresto and Aldactone on hold due to has patient sasha pressor support until this morning  Will up titrate goal-directed medical therapy as tolerated once patient is hemodynamically more stable    # HLD: atorvastatin 80mg, ezetimibe 10mg     # CAD: aspirin 81mg daily, ticagrelor 60mg q12H     # DM2    Subjective:   Patient seen and examined  No significant events overnight  Patient was weaned off of sasha this morning  Objective:     Vitals: Blood pressure (!) 104/49, pulse 56, temperature 98 6 °F (37 °C), resp  rate 12, height 5' 7" (1 702 m), weight 69 2 kg (152 lb 8 9 oz), SpO2 95 %  , Body mass index is 23 89 kg/m² ,   Orthostatic Blood Pressures      Most Recent Value   Blood Pressure  (!) 104/49 filed at 05/18/2021 1018   Patient Position - Orthostatic VS  Sitting filed at 05/16/2021 2045            Intake/Output Summary (Last 24 hours) at 5/18/2021 1133  Last data filed at 5/18/2021 1001  Gross per 24 hour   Intake 2344 ml   Output 1425 ml   Net 919 ml           Physical Exam:    GEN: Josh Estrada appears well, alert and oriented x 3, pleasant and cooperative   HEENT: anicteric, mucous membranes moist  NECK: no jvd, carotid bruits   HEART: regular rhythm, normal S1 and S2, no murmurs, clicks, gallops or rubs   LUNGS: clear to auscultation bilaterally; no wheezes, rales, or rhonchi   ABDOMEN: normal bowel sounds, soft, no tenderness, no distention  EXTREMITIES: peripheral pulses normal; no clubbing, cyanosis, or edema  NEURO: no focal findings   SKIN: normal without suspicious lesions on exposed skin      Current Facility-Administered Medications:     acetaminophen (TYLENOL) oral suspension 650 mg, 650 mg, Per NG Tube, Q6H PRN, Tha Huang PA-C    amiodarone (CORDARONE) 900 mg in dextrose 5 % 500 mL infusion, 0 5 mg/min, Intravenous, Continuous, Mariangel Nelson PA-C, Last Rate: 16 7 mL/hr at 05/18/21 0904, 0 5 mg/min at 05/18/21 0904    amiodarone tablet 400 mg, 400 mg, Oral, BID, Mariangel Nelson PA-C, 400 mg at 05/18/21 1329    aspirin chewable tablet 81 mg, 81 mg, Per NG Tube, Daily, Tha Huang PA-C, 81 mg at 05/18/21 0816    atorvastatin (LIPITOR) tablet 80 mg, 80 mg, Per NG Tube, Daily With Reina Hartmann PA-C, 80 mg at 05/17/21 1549    chlorhexidine (PERIDEX) 0 12 % oral rinse 15 mL, 15 mL, Mouth/Throat, Q12H [address], Frances HILL Huang PA-C, 15 mL at 05/18/21 0817    ezetimibe (ZETIA) tablet 10 mg, 10 mg, Per NG Tube, Daily, Tha Huang PA-C, 10 mg at 05/18/21 0817    fentanyl citrate (PF) 100 MCG/2ML 50 mcg, 50 mcg, Intravenous, Q2H PRN, Tha Huang PA-C, 50 mcg at 05/17/21 0329    heparin (porcine) subcutaneous injection 5,000 Units, 5,000 Units, Subcutaneous, Q8H MARI, Mariangel Nelson PA-C, 5,000 Units at 05/18/21 0527    insulin lispro (HumaLOG) 100 units/mL subcutaneous injection 1-5 Units, 1-5 Units, Subcutaneous, TID AC **AND** Fingerstick Glucose (POCT), , , TID AC, Mariangel E Sterner, PA-C    metoprolol tartrate (LOPRESSOR) tablet 25 mg, 25 mg, Oral, Q12H MARI, Mariangel E Sterner, PA-C, 25 mg at 05/18/21 0816    mexiletine (MEXITIL) capsule 150 mg, 150 mg, Oral, Q8H MARI, Mariangel E Sterner, PA-C, 150 mg at 05/18/21 0817    pantoprazole (PROTONIX) injection 40 mg, 40 mg, Intravenous, Q24H MARI, Mariangel E Sterner, PA-C, 40 mg at 05/18/21 0817    senna-docusate sodium (SENOKOT S) 8 6-50 mg per tablet 1 tablet, 1 tablet, Oral, HS, Mariangel E Sterner, PA-C    tetanus-diphtheria-acellular pertussis (BOOSTRIX) IM injection 0 5 mL, 0 5 mL, Intramuscular, Once, Jose Doherty MD    ticagrelor tablet 60 mg, 60 mg, Per NG Tube, Q12H [address], Nai Usman Huang PA-C, 60 mg at 05/18/21 0817    Labs & Results:    Lab Results   Component Value Date    TROPONINI 0 79 (H) 05/17/2021    TROPONINI 0 83 (H) 05/17/2021    TROPONINI 0 76 (H) 05/17/2021       Lab Results   Component Value Date    GLUCOSE 215 (H) 05/16/2021    CALCIUM 9 0 05/18/2021    K 4 3 05/18/2021    CO2 20 (L) 05/18/2021     05/18/2021    BUN 27 (H) 05/18/2021    CREATININE 1 05 05/18/2021       Lab Results   Component Value Date    WBC 13 52 (H) 05/18/2021    HGB 10 9 (L) 05/18/2021    HCT 33 9 (L) 05/18/2021    MCV 84 05/18/2021     05/18/2021     Results from last 7 days   Lab Units 05/16/21  2355   INR  1 06       No results found for: CHOL  No results found for: HDL  No results found for: LDLCALC  No results found for: TRIG    Lab Results   Component Value Date     (H) 05/18/2021     (H) 05/18/2021         EKG personally reviewed by )Jerry Escalera MD  No acute changes   TELE: No significant arrhythmias seen on telemetry review

## 2021-05-18 NOTE — PROGRESS NOTES
Daily Progress Note - Critical Care   Ruslan Shannon 80 y o  female MRN: [id number]  Unit/Bed#: [facility] 047-13 Encounter: [id number]        ----------------------------------------------------------------------------------------  HPI: 80 yr old female PMH  CAD s/p JESSIKA ischemic CM (EF 25-40%), prior CT episode 3/2021, HTN, HLD, DM, CKD presented 5/17 with syncope found to be in VT storm  Intubated in ED for airway protection  Received 150 amiodarone bolus x 2 in ED and started on amiodarone infusion  Received 200 lidocaine bolus and started on lidocaine infusion in ED  Started on esmolol infusion in ED  24hr events:   · No VT events since admitted to ICU  · Extubated  · Started on Mexilitine, lopressor, amiodarone PO  · Esmolol weaned to off  · Lidocaine weaned to 1mg/min  · Amiodarone remained at 1mg/min  · Neosynephrine weaned to off  · Zofran x 1 for nausea    ---------------------------------------------------------------------------------------  SUBJECTIVE  States she is feeling slightly nauseaus  Otherwise feels well       Review of Systems  Review of systems was reviewed and negative unless stated above in HPI/24-hour events   ---------------------------------------------------------------------------------------  Assessment and Plan:    Neuro:    Diagnosis: Calcified falx   o Initially presented as trauma alert  o CTH with 5mm hyperdensity at falx question calcification vs tiny SDH  o F/u repeat CTH from yesterday evening, appears stable     Diagnosis: ICU delirium prevention  o Monitor CAM-ICU  o Regulate sleep-wake  o Frequent reorientation    CV:    Diagnosis: VT storm, improved likely scar mediated, CAD, ischemic CM  o Heart failure following, appreciate recommendations  o Continue Amiodarone 400mg BID  o Continue Mexiletine 150mg Q8  o Continue Lopressor 25mg BID  o Continue Amiodarone infusion 1mg/min  o Wean Lidocaine to off  o Continue ASA/Brillinta  o Continue Lipitor  o Continue Zetia  o Continue to monitor on telemetry  o D/C a line      Pulm:   Diagnosis: No acute issues  o Intubated 5/17, extubated 5/17  o Maintained on room air  o Continue pulmonary hygiene    GI:    Diagnosis: Nausea  o PRN Zofran, monitor QTc   Bowel regimen  o Senokot S   GI Prophylaxis  o Protonix, likely can d/c      :    Diagnosis: YOLIS on CKD, improving  o Cr 1 05 from 1 4 yesterday   o UOP 1 5L in last 24 hrs, net +415   o Continue to trend UOP, BUN/Cr  o D/C Kauffman      F/E/N:    Fluids: NO indication   Electrolytes: Replete as indicated for K>4, Phos >3, Mag >2   Nutrition: Carb controlled      Heme/Onc:    Diagnosis: No acute issues   DVT Prophylaxis  o SQH      Endo:    Diagnosis: History of DM  - SSI  o Continue to monitor BS with goal 140-180      ID:    Diagnosis: No acute issues    o Continue to trend WBC and fever curve      MSK/Skin:    Diagnosis: Scalp laceration  o Repaired by trauma  o Suture removal 5/27   Encourage early mobility  o Frequent repositioning and pressure off loading  o Monitor for signs of skin breakdown  o PT/OT consulted     Family Updates:    updated at [facility]    Disposition: Continue Critical Care   Code Status: Level 1 - Full Code  ---------------------------------------------------------------------------------------  ICU CORE MEASURES    Prophylaxis   VTE Pharmacologic Prophylaxis: Heparin  VTE Mechanical Prophylaxis: sequential compression device  Stress Ulcer Prophylaxis: Pantoprazole IV     ABCDE Protocol (if indicated)  Plan to perform spontaneous awakening trial today? Not applicable  Plan to perform spontaneous breathing trial today? Not applicable  Obvious barriers to extubation?  Not applicable  CAM-ICU: Negative    Invasive Devices Review  Invasive Devices     Central Venous Catheter Line            CVC Central Lines 05/16/21 Triple 2 days          Peripheral Intravenous Line            Peripheral IV 05/16/21 Left Forearm 1 day    Peripheral IV 05/16/21 Right Arm 1 day          Arterial Line            Arterial Line 21 Right Radial 1 day          Drain            Urethral Catheter Temperature probe 1 day              Can any invasive devices be discontinued today? No  ---------------------------------------------------------------------------------------  OBJECTIVE    Vitals   Vitals:    21 0320 21 0520 21 0533 21 0620   BP: (!) 95/46 110/58  100/54   Pulse: 58 58  58   Resp: 17 13  13   Temp: 99 °F (37 2 °C) 98 6 °F (37 °C)  98 6 °F (37 °C)   TempSrc:       SpO2: 96% 96%  95%   Weight:   69 2 kg (152 lb 8 9 oz)    Height:         Temp (24hrs), Av 1 °F (37 3 °C), Min:98 6 °F (37 °C), Max:100 °F (37 8 °C)  Current: Temperature: 98 6 °F (37 °C)      Respiratory:  SpO2: SpO2: 95 %       Invasive/non-invasive ventilation settings   Respiratory    Lab Data (Last 4 hours)    None         O2/Vent Data (Last 4 hours)    None                Physical Exam  Constitutional:       General: She is not in acute distress  Appearance: She is not ill-appearing  HENT:      Head: Normocephalic  Mouth/Throat:      Mouth: Mucous membranes are moist    Eyes:      Extraocular Movements: Extraocular movements intact  Pupils: Pupils are equal, round, and reactive to light  Neck:      Musculoskeletal: Neck supple  Cardiovascular:      Rate and Rhythm: Normal rate and regular rhythm  Pulses: Normal pulses  Heart sounds: No murmur  No friction rub  No gallop  Pulmonary:      Effort: Pulmonary effort is normal       Breath sounds: Normal breath sounds  No wheezing, rhonchi or rales  Abdominal:      General: Bowel sounds are normal       Palpations: Abdomen is soft  Musculoskeletal: Normal range of motion  General: No swelling or tenderness  Skin:     General: Skin is warm  Neurological:      General: No focal deficit present  Mental Status: She is alert and oriented to person, place, and time   Mental status is at baseline     Psychiatric:         Mood and Affect: Mood normal          Behavior: Behavior normal            Laboratory and Diagnostics:  Results from last 7 days   Lab Units 05/18/21 0519 05/17/21 0507 05/16/21 2355 05/16/21 2319 05/16/21 1744 05/16/21  1743   WBC Thousand/uL 13 52* 16 13* 17 64*  --   --  14 29*   HEMOGLOBIN g/dL 10 9* 12 1 12 5  --   --  13 6   I STAT HEMOGLOBIN g/dl  --   --   --  12 9 15 0  --    HEMATOCRIT % 33 9* 37 3 39 4  --   --  42 6   HEMATOCRIT, ISTAT %  --   --   --  38 44  --    PLATELETS Thousands/uL 214 303 274  --   --  322   NEUTROS PCT % 81* 80*  --   --   --  58   MONOS PCT % 6 6  --   --   --  5     Results from last 7 days   Lab Units 05/18/21 0519 05/17/21 0507 05/16/21 2355 05/16/21 2319 05/16/21 2123 05/16/21 1744 05/16/21  1743   SODIUM mmol/L 133* 137 137  --  141  --  137   POTASSIUM mmol/L 4 3 5 2 4 5  --  5 6*  --  5 0   CHLORIDE mmol/L 106 110* 109*  --  111*  --  104   CO2 mmol/L 20* 20* 17*  --  21  --  19*   CO2, I-STAT mmol/L  --   --   --  19*  --  19*  --    ANION GAP mmol/L 7 7 11  --  9  --  14*   BUN mg/dL 27* 38* 38*  --  37*  --  42*   CREATININE mg/dL 1 05 1 44* 1 48*  --  1 53*  --  1 83*   CALCIUM mg/dL 9 0 10 3* 11 1*  --  10 2*  --  10 0   GLUCOSE RANDOM mg/dL 158* 178* 295*  --  132  --  171*   ALT U/L 108* 142*  --   --   --   --   --    AST U/L 103* 103*  --   --   --   --   --    ALK PHOS U/L 68 89  --   --   --   --   --    ALBUMIN g/dL 2 9* 3 3*  --   --   --   --   --    TOTAL BILIRUBIN mg/dL 0 54 1 00  --   --   --   --   --      Results from last 7 days   Lab Units 05/17/21  0507 05/16/21  2355 05/16/21 2123   MAGNESIUM mg/dL 4 1* 5 4* 2 8*   PHOSPHORUS mg/dL 3 3 3 7 4 1      Results from last 7 days   Lab Units 05/16/21  2355   INR  1 06      Results from last 7 days   Lab Units 05/17/21  1313 05/17/21  1042 05/17/21  0747 05/17/21  0001 05/16/21 2123 05/16/21  1743   TROPONIN I ng/mL 0 79* 0 83* 0 76* 0 17* 0 10* <0 02 Results from last 7 days   Lab Units 05/17/21  0206 05/16/21  2355   LACTIC ACID mmol/L 2 0 2 2*     ABG:  Results from last 7 days   Lab Units 05/17/21  0747   PH ART  7 504*   PCO2 ART mm Hg 23 5*   PO2 ART mm Hg 228 4*   HCO3 ART mmol/L 18 1*   BASE EXC ART mmol/L -3 3   ABG SOURCE  Line, Arterial     VBG:  Results from last 7 days   Lab Units 05/17/21  0747  05/17/21  0206   PH KELLIE   --   --  7 433*   PCO2 KELLIE mm Hg  --   --  33 2*   PO2 KELLIE mm Hg  --   --  34 5*   HCO3 KELLIE mmol/L  --   --  21 7*   BASE EXC KELLIE mmol/L  --   --  -1 8   ABG SOURCE  Line, Arterial   < > Line, Arterial    < > = values in this interval not displayed  Micro        EKG: NSR  Imaging:  I have personally reviewed pertinent reports  and I have personally reviewed pertinent films in PACS    Intake and Output  I/O       05/16 0701 - 05/17 0700 05/17 0701 - 05/18 0700    P  O   620    I V  (mL/kg) 745 8 (10 6) 1340 5 (19 4)    NG/GT 0 90    IV Piggyback 350     Total Intake(mL/kg) 1095 8 (15 5) 2050 5 (29 6)    Urine (mL/kg/hr) 1275 1585 (1)    Emesis/NG output 0 50    Total Output 1275 1635    Net -179 3 +415 5              UOP: 1 ml/kg/hr     Height and Weights   Height: 5' 7" (170 2 cm)  IBW (Ideal Body Weight): 61 6 kg  Body mass index is 23 89 kg/m²  Weight (last 2 days)     Date/Time   Weight    05/18/21 0533   69 2 (152 56)    05/17/21 0600   70 6 (155 65)    05/16/21 17:42:36   65 2 (143 74)                Nutrition       Diet Orders   (From admission, onward)             Start     Ordered    05/17/21 1425  Diet Adam/CHO Controlled; Consistent Carbohydrate Diet Level 2 (5 carb servings/75 grams CHO/meal); Sodium 2 GM  Diet effective now     Question Answer Comment   Diet Type Adam/CHO Controlled    Adam/CHO Controlled Consistent Carbohydrate Diet Level 2 (5 carb servings/75 grams CHO/meal)    Other Restriction(s): Sodium 2 GM    RD to adjust diet per protocol?  Yes        05/17/21 9525                    Active Medications  Scheduled Meds:  Current Facility-Administered Medications   Medication Dose Route Frequency Provider Last Rate    acetaminophen  650 mg Per NG Tube Q6H PRN Elmo Barragan PA-C      albuterol  2 5 mg Nebulization Q4H PRN Linda Dobson MD      amiodarone  1 mg/min Intravenous Continuous Kiara Sanchez MD 1 mg/min (05/18/21 0034)    amiodarone  400 mg Oral BID Mily Benito PA-C      aspirin  81 mg Per NG Tube Daily Elmo Barragan PA-C      atorvastatin  80 mg Per NG Tube Daily With [employer]      chlorhexidine  15 mL Mouth/Throat Q12H [facility] Elmo Barragan PA-C      ezetimibe  10 mg Per NG Tube Daily Chip Najjar Rasmuson, PA-C      fentanyl citrate (PF)  50 mcg Intravenous Q2H PRN Elmo Barragan PA-C      heparin (porcine)  5,000 Units Subcutaneous Q8H [facility] Mariangel E DEVON Nelson      insulin lispro  1-5 Units Subcutaneous Q6H [facility] Frances Huang PA-C      lidocaine in dextrose 5%  1 mg/min Intravenous Continuous Mariangel E DEVON Nelson 1 mg/min (05/18/21 0513)    metoprolol tartrate  25 mg Oral Q12H [facility] Mariangel E DEVON Nelson      mexiletine  150 mg Oral Q8H [facility] Mariangel E SternDEVON lu      pantoprazole  40 mg Intravenous Q24H [facility] Mariangel E DEVON Nelson      phenylephine   mcg/min Intravenous Titrated Geovany Gomez MD Stopped (05/17/21 1602)    tetanus-diphtheria-acellular pertussis  0 5 mL Intramuscular Once Mitch Stafford MD      ticagrelor  60 mg Per NG Tube Q12H [facility] Frances Huang PA-C       Continuous Infusions:  amiodarone, 1 mg/min, Last Rate: 1 mg/min (05/18/21 0034)  lidocaine in dextrose 5%, 1 mg/min, Last Rate: 1 mg/min (05/18/21 0513)  phenylephine,  mcg/min, Last Rate: Stopped (05/17/21 5520)      PRN Meds:   acetaminophen, 650 mg, Q6H PRN  albuterol, 2 5 mg, Q4H PRN  fentanyl citrate (PF), 50 mcg, Q2H PRN        Allergies   No Known Allergies  ---------------------------------------------------------------------------------------  Advance Directive and Living Will:      Power of :    POLST:    ---------------------------------------------------------------------------------------  Care Time Delivered:   No Critical Care time spent     [facility]      Portions of the record may have been created with voice recognition software  Occasional wrong word or "sound a like" substitutions may have occurred due to the inherent limitations of voice recognition software    Read the chart carefully and recognize, using context, where substitutions have occurred

## 2021-05-18 NOTE — PROGRESS NOTES
Progress Note - Electrophysiology-Cardiology (EP)   Isaac Mederos 80 y o  female MRN: [id number]  Unit/Bed#: [facility] 518-01 Encounter: [id number]      Assessment:  1  VT with appropriate ICD therapy as well as successful external shocks, no further episodes   A ) currently on IV amiodarone, IV lidocaine, an oral mexiletine (esmolol previously discontinued)              B ) [employer] single chamber ICD in situ              C ) maintained on amiodarone 200 mg daily and Toprol 25 mg daily as an outpatient  2  Prior cardiac arrest 1/2020  3  CAD status post JESSIKA to D1, RPL and mRCA 9/2019, maintained on Brilinta and aspirin              A ) known residual left circumflex disease   4  Ischemic cardiomyopathy with LVEF 25% per echo this admission              A ) severe hypokinesis of the mid-apical anterior, mid anteroseptal, and apical wall              B ) maintained on Entresto and Toprol as an outpatient  5  Chronic HFrEF  6  Hypertension  7  Hyperlipidemia  8  Diabetes mellitus type 2      Plan:  Fortunately, she is maintaining sinus rhythm without further ventricular arrhythmias  She has reported nausea and dizziness, and her amiodarone gtt has already been reduced  Per Dr Rasheeda Hendricks, will discontinue IV lidocaine at this time  She will continue oral mexiletine, and we will continue to monitor for at least another 24-48 hours to ensure that she is having no further arrhythmias  She has been following with Dr Danielito Beltran as an outpatient, and Dr Rasheeda Hendricks will reach out to him to discuss this patient and formulate a plan moving forward  Subjective/Objective   Chief Complaint: nauseated and dizzy, but overall feeling better    Subjective: Patient is feeling better today, is extubated and alert  She admits to nausea (which she attributes to IV amiodarone), as well as mild dizziness  She denies chest pain or shortness of breath       Objective:     Vitals: BP (!) 104/49   Pulse 56   Temp 98 6 °F (37 °C)   Resp 12 Ht 5' 7" (1 702 m)   Wt 69 2 kg (152 lb 8 9 oz)   SpO2 95%   BMI 23 89 kg/m²   Vitals:    05/17/21 0600 05/18/21 0533   Weight: 70 6 kg (155 lb 10 3 oz) 69 2 kg (152 lb 8 9 oz)     Orthostatic Blood Pressures      Most Recent Value   Blood Pressure  (!) 104/49 filed at 05/18/2021 1018   Patient Position - Orthostatic VS  Sitting filed at 05/16/2021 2045            Intake/Output Summary (Last 24 hours) at 5/18/2021 1143  Last data filed at 5/18/2021 1001  Gross per 24 hour   Intake 2344 ml   Output 1425 ml   Net 919 ml       Invasive Devices     Peripheral Intravenous Line            Peripheral IV 05/16/21 Left Forearm 1 day    Peripheral IV 05/16/21 Right Arm 1 day                            Scheduled Meds:  Current Facility-Administered Medications   Medication Dose Route Frequency Provider Last Rate    acetaminophen  650 mg Per NG Tube Q6H PRN Tremaine Riley PA-C      amiodarone  0 5 mg/min Intravenous Continuous Adalberto Starkey PA-C 0 5 mg/min (05/18/21 0904)    amiodarone  400 mg Oral BID Adalberto Starkey PA-C      aspirin  81 mg Per NG Tube Daily Tremaine Riley PA-C      atorvastatin  80 mg Per NG Tube Daily With [employer]      chlorhexidine  15 mL Mouth/Throat Q12H [address] Tremaine Riley PA-C      ezetimibe  10 mg Per NG Tube Daily Lady Huang PA-C      fentanyl citrate (PF)  50 mcg Intravenous Q2H PRN Tremaine Riley PA-C      heparin (porcine)  5,000 Units Subcutaneous Q8H [address] Mariangel E SternerDEVON      insulin lispro  1-5 Units Subcutaneous TID AC Mariangel E SternDEVON lu      metoprolol tartrate  25 mg Oral Q12H [address] Mariangel E SternerDEVON      mexiletine  150 mg Oral Q8H [address] Mariangel E SternerDEVON      pantoprazole  40 mg Intravenous Q24H [address] Mariangel E Sterner, PA-C      senna-docusate sodium  1 tablet Oral HS Mariangel Nelson PA-C      tetanus-diphtheria-acellular pertussis  0 5 mL Intramuscular Once Donis Nava MD      ticagrelor 60 mg Per NG Tube Q12H [facility] Frances Huang PA-C       Continuous Infusions:amiodarone, 0 5 mg/min, Last Rate: 0 5 mg/min (05/18/21 0904)      PRN Meds:   acetaminophen    fentanyl citrate (PF)    Review of Systems   Constitution: Positive for malaise/fatigue  Negative for fever  Cardiovascular: Negative for chest pain, dyspnea on exertion, irregular heartbeat, leg swelling, near-syncope, orthopnea, palpitations, paroxysmal nocturnal dyspnea and syncope  Gastrointestinal: Positive for nausea  Neurological: Positive for dizziness (mild)  All other systems reviewed and are negative  Physical Exam:   GEN: NAD, alert and oriented x 3, well appearing  SKIN: dry without significant lesions or rashes  HEENT: NCAT, PERRL, EOMs intact  NECK: No JVD appreciated  CARDIOVASCULAR: RRR, normal S1, S2 without murmurs, rubs, or gallops appreciated  LUNGS: Clear to auscultation bilaterally without wheezes, rhonchi, or rales  ABDOMEN: Soft, nontender, nondistended  EXTREMITIES/VASCULAR: perfused without clubbing, cyanosis, or LE edema b/l  PSYCH: Normal mood and affect  NEURO: CN ll-Xll grossly intact                Lab Results: I have personally reviewed pertinent lab results      Results from last 7 days   Lab Units 05/18/21 0519 05/17/21 0507 05/16/21  2355   WBC Thousand/uL 13 52* 16 13* 17 64*   HEMOGLOBIN g/dL 10 9* 12 1 12 5   HEMATOCRIT % 33 9* 37 3 39 4   PLATELETS Thousands/uL 214 303 274     Results from last 7 days   Lab Units 05/18/21  0519 05/17/21  0507 05/16/21  2355 05/16/21  2319   POTASSIUM mmol/L 4 3 5 2 4 5  --    CHLORIDE mmol/L 106 110* 109*  --    CO2 mmol/L 20* 20* 17*  --    CO2, I-STAT mmol/L  --   --   --  19*   BUN mg/dL 27* 38* 38*  --    CREATININE mg/dL 1 05 1 44* 1 48*  --    GLUCOSE, ISTAT mg/dl  --   --   --  215*   CALCIUM mg/dL 9 0 10 3* 11 1*  --      Results from last 7 days   Lab Units 05/16/21  2355   INR  1 06     Results from last 7 days   Lab Units 05/18/21  0519 21  0507 21  2355   MAGNESIUM mg/dL 2 4 4 1* 5 4*         Imaging: I have personally reviewed pertinent reports  Results for orders placed during the hospital encounter of 21   Echo complete with contrast if indicated    Narrative [address]  [facility], [address] [facility]  [phone]    Transthoracic Echocardiogram  2D, M-mode, Doppler, and Color Doppler    Study date:  17-May-2021    Patient: Brigitte Randolph  MR number: [id number]  Account number: [de-identified]  : [id number]  Age: 80 years  Gender: Female  Status: Inpatient  Location: Bedside  Height: 67 in  Weight: 143 lb  BP: 135/ 66 mmHg    Indications: CM  Diagnoses: I42 9 - Cardiomyopathy, unspecified    Sonographer:  GERRY Eugene  Referring Physician:  Jerel Boeck, PA-C  Group:  [facility]  Cardiology Fellow:  Justen Kidd DO  Interpreting Physician:  Sobeida Carpio MD    SUMMARY    LEFT VENTRICLE:  The ventricle was mildly dilated  Systolic function was markedly reduced  Ejection fraction was estimated to be 25 %  There was moderate diffuse hypokinesis  There was severe hypokinesis of the mid-apical anterior, mid anteroseptal, and apical wall(s)  The changes were consistent with concentric remodeling (increased wall thickness with normal wall mass)  Features were consistent with a pseudonormal left ventricular filling pattern, with concomitant abnormal relaxation and increased filling pressure (grade 2 diastolic dysfunction)  Doppler parameters were consistent with elevated mean left atrial filling pressure  RIGHT VENTRICLE:  The size was normal   Systolic function was normal     MITRAL VALVE:  There was mild to moderate annular calcification  There was mild regurgitation  AORTIC VALVE:  There was no evidence for stenosis  TRICUSPID VALVE:  There was mild regurgitation  IVC, HEPATIC VEINS:  The inferior vena cava was mildly dilated    The respirophasic change in diameter was less than 50%  HISTORY: PRIOR HISTORY: CAD;CHF  PROCEDURE: The procedure was performed at the bedside  This was a routine study  The transthoracic approach was used  The study included complete 2D imaging, M-mode, complete spectral Doppler, and color Doppler  The heart rate was 110 bpm,  at the start of the study  This was a technically difficult study  LEFT VENTRICLE: The ventricle was mildly dilated  Systolic function was markedly reduced  Ejection fraction was estimated to be 25 %  There was moderate diffuse hypokinesis  There was severe hypokinesis of the mid-apical anterior, mid  anteroseptal, and apical wall(s)  Wall thickness was normal  The changes were consistent with concentric remodeling (increased wall thickness with normal wall mass)  DOPPLER: Features were consistent with a pseudonormal left ventricular  filling pattern, with concomitant abnormal relaxation and increased filling pressure (grade 2 diastolic dysfunction)  Doppler parameters were consistent with elevated mean left atrial filling pressure  RIGHT VENTRICLE: The size was normal  Systolic function was normal  Wall thickness was normal     LEFT ATRIUM: Size was normal     RIGHT ATRIUM: Size was normal  A pacing wire was present  MITRAL VALVE: There was mild to moderate annular calcification  There was normal leaflet separation  DOPPLER: The transmitral velocity was within the normal range  There was no evidence for stenosis  There was mild regurgitation  AORTIC VALVE: The valve was trileaflet  Leaflets exhibited mild to moderate calcification, normal cuspal separation, and sclerosis  DOPPLER: Transaortic velocity was within the normal range  There was no evidence for stenosis  There was no  regurgitation  TRICUSPID VALVE: The valve structure was normal  There was normal leaflet separation  DOPPLER: The transtricuspid velocity was within the normal range   There was no evidence for stenosis  There was mild regurgitation  Estimated peak PA  pressure was 45 mmHg  The findings suggest mild pulmonary hypertension  PULMONIC VALVE: Leaflets exhibited normal thickness, no calcification, and normal cuspal separation  DOPPLER: The transpulmonic velocity was within the normal range  There was trace regurgitation  PERICARDIUM: There was no pericardial effusion  The pericardium was normal in appearance  AORTA: The root exhibited normal size  SYSTEMIC VEINS: IVC: The inferior vena cava was normal in size and course  The inferior vena cava was mildly dilated  The respirophasic change in diameter was less than 50%      SYSTEM MEASUREMENT TABLES    2D  %FS: 30 77 %  Ao Diam: 2 39 cm  EDV(Teich): 154 23 ml  EF(Teich): 57 71 %  ESV(Teich): 65 23 ml  IVSd: 0 87 cm  LA Area: 14 86 cm2  LA Diam: 3 53 cm  LVEDV MOD A4C: 92 56 ml  LVEF MOD A4C: 47 %  LVESV MOD A4C: 49 06 ml  LVIDd: 5 61 cm  LVIDs: 3 88 cm  LVLd A4C: 7 45 cm  LVLs A4C: 6 22 cm  LVPWd: 0 87 cm  RA Area: 10 9 cm2  RVIDd: 2 72 cm  SV MOD A4C: 43 5 ml  SV(Teich): 89 01 ml    CW  TR Vmax: 2 78 m/s  TR maxP 98 mmHg    MM  TAPSE: 2 02 cm    PW  E' Sept: 0 03 m/s  E/E' Sept: 27 02  MV A [place]: 0 54 m/s  MV Dec [place]: 2 56 m/s2  MV DecT: 295 03 ms  MV E [place]: 0 76 m/s  MV E/A Ratio: 1 41  MV PHT: 85 56 ms  MVA By PHT: 2 57 cm2    [employer] Accredited [facility]chocardi[facility]    Prepared and electronically signed by    Khurram Gonzáles MD  Signed 17-May-2021 11:19:55         EKG/telemetry:  Normal sinus rhythm without further ventricular arrhythmias    VTE Pharmacologic Prophylaxis: Heparin subQ  VTE Mechanical Prophylaxis: sequential compression device

## 2021-05-19 PROBLEM — J96.01 ACUTE RESPIRATORY FAILURE WITH HYPOXIA (HCC): Status: ACTIVE | Noted: 2021-01-01

## 2021-05-19 NOTE — PROGRESS NOTES
Progress Note - Electrophysiology-Cardiology (EP)   Rehana Mace 80 y o  female MRN: [id number]  Unit/Bed#: [facility] 518-01 Encounter: [id number]      Assessment:  1  VT with appropriate ICD therapy as well as successful external shocks, no further episodes              A ) currently on oral amiodarone and mexiletine               B ) [employer] single chamber ICD in situ              C ) maintained on amiodarone 200 mg daily and Toprol 25 mg daily as an outpatient  2  Prior cardiac arrest 1/2020  3  CAD status post JESSIKA to D1, RPL and mRCA 9/2019, maintained on Brilinta and aspirin              A ) known residual left circumflex disease   4  Ischemic cardiomyopathy with LVEF 25% per echo this admission              A ) severe hypokinesis of the mid-apical anterior, mid anteroseptal, and apical wall              B ) maintained on Entresto and Toprol as an outpatient  5  Chronic HFrEF  6  Hypertension  7  Hyperlipidemia  8  Diabetes mellitus type 2      Plan:  She continues to do well, with no recent episodes of ventricular tachycardia  She reported nausea likely associated with IV amiodarone, and it was discontinued  She is currently on amiodarone 200 mg twice daily, mexiletine 150 mg 3 times daily, and metoprolol 25 mg twice daily  Recommend continuing this regimen, and if no further arrhythmias after another 24 hours of monitoring then she can be discharged on these medications  Dr Zohaib Hernandez discussed this patient with her outpatient electrophysiologist (Dr Carolina Pedroza), who is going to see her soon in follow-up  He will likely discuss repeat cardiac catheterization as well as VT ablation moving forward  We relayed this to the patient, and she knows to contact his office upon discharge to arrange an appointment  Will send a script to pharmacy to price mexiletine to ensure that it is an affordable option upon discharge  Continue to monitor telemetry and electrolytes closely        Subjective/Objective Chief Complaint:  No acute complaints    Subjective:  She is feeling well overall, denies chest pain or pressure, shortness of breath, palpitations, dizziness, or lightheadedness  No acute events reported overnight  She had ongoing nausea which was attributed to IV amiodarone, which has been discontinued      Objective:     Vitals: BP 93/50   Pulse 58   Temp 98 6 °F (37 °C) (Oral)   Resp 22   Ht 5' 7" (1 702 m)   Wt 69 2 kg (152 lb 8 9 oz)   SpO2 96%   BMI 23 89 kg/m²   Vitals:    05/18/21 0533 05/19/21 0558   Weight: 69 2 kg (152 lb 8 9 oz) 69 2 kg (152 lb 8 9 oz)     Orthostatic Blood Pressures      Most Recent Value   Blood Pressure  93/50 filed at 05/19/2021 0950   Patient Position - Orthostatic VS  Sitting filed at 05/16/2021 2045            Intake/Output Summary (Last 24 hours) at 5/19/2021 1224  Last data filed at 5/19/2021 0901  Gross per 24 hour   Intake 1304 1 ml   Output 2775 ml   Net -1470 9 ml       Invasive Devices     Peripheral Intravenous Line            Peripheral IV 05/16/21 Left Forearm 2 days    Peripheral IV 05/16/21 Right Arm 2 days                            Scheduled Meds:  Current Facility-Administered Medications   Medication Dose Route Frequency Provider Last Rate    acetaminophen  650 mg Per NG Tube Q6H PRN Michelle Patel PA-C      amiodarone  200 mg Oral BID CLARE Akers      aspirin  81 mg Per NG Tube Daily Michelle Patel PA-C      atorvastatin  80 mg Per NG Tube Daily With [employer]      ezetimibe  10 mg Per NG Tube Daily Michelle Patel PA-C      heparin (porcine)  5,000 Units Subcutaneous Q8H [facility] Mariangel Nelson PA-C      insulin lispro  1-5 Units Subcutaneous TID  Mariangel Nelson PA-C      metoclopramide  10 mg Intravenous Q6H PRN Reny Iraheta PA-C      metoprolol tartrate  25 mg Oral Q12H [facility] Mariangelpolina Nelson PA-C      mexiletine  150 mg Oral Q8H [facility] Mariangel Nelson PA-C      senna-docusate sodium 1 tablet Oral HS Mariangel Nelson PA-C      tetanus-diphtheria-acellular pertussis  0 5 mL Intramuscular Once Noé Pastor MD      ticagrelor  60 mg Per NG Tube Q12H [address] Frances Huang PA-C       Continuous Infusions:   PRN Meds:   acetaminophen    metoclopramide    Review of Systems   Constitution: Negative for fever and malaise/fatigue  Cardiovascular: Negative for chest pain, dyspnea on exertion, irregular heartbeat, leg swelling, near-syncope, orthopnea, palpitations, paroxysmal nocturnal dyspnea and syncope  Gastrointestinal: Positive for nausea  All other systems reviewed and are negative  Physical Exam:   GEN: NAD, alert and oriented x 3, well appearing  SKIN: dry without significant lesions or rashes  HEENT: NCAT, PERRL, EOMs intact  NECK: No JVD appreciated  CARDIOVASCULAR: RRR, normal S1, S2 without murmurs, rubs, or gallops appreciated  LUNGS: Clear to auscultation bilaterally without wheezes, rhonchi, or rales  ABDOMEN: Soft, nontender, nondistended  EXTREMITIES/VASCULAR: perfused without clubbing, cyanosis, or LE edema b/l  PSYCH: Normal mood and affect  NEURO: CN ll-Xll grossly intact                Lab Results: I have personally reviewed pertinent lab results  Results from last 7 days   Lab Units 05/19/21  0542 05/18/21  0519 05/17/21  0507   WBC Thousand/uL 15 13* 13 52* 16 13*   HEMOGLOBIN g/dL 11 9 10 9* 12 1   HEMATOCRIT % 38 2 33 9* 37 3   PLATELETS Thousands/uL 211 214 303     Results from last 7 days   Lab Units 05/19/21  0542 05/18/21  0519 05/17/21  0507  05/16/21  2319   POTASSIUM mmol/L 3 6 4 3 5 2   < >  --    CHLORIDE mmol/L 107 106 110*   < >  --    CO2 mmol/L 21 20* 20*   < >  --    CO2, I-STAT mmol/L  --   --   --   --  19*   BUN mg/dL 20 27* 38*   < >  --    CREATININE mg/dL 1 05 1 05 1 44*   < >  --    GLUCOSE, ISTAT mg/dl  --   --   --   --  215*   CALCIUM mg/dL 9 1 9 0 10 3*   < >  --     < > = values in this interval not displayed       Results from last 7 days   Lab Units 21  2355   INR  1 06     Results from last 7 days   Lab Units 21  0542 21  0519 21  0507   MAGNESIUM mg/dL 1 7 2 4 4 1*         Imaging: I have personally reviewed pertinent reports  Results for orders placed during the hospital encounter of 21   Echo complete with contrast if indicated    Narrative [address]  [facility], [address] [facility]  [phone]    Transthoracic Echocardiogram  2D, M-mode, Doppler, and Color Doppler    Study date:  17-May-2021    Patient: Mark Mead  MR number: [id number]  Account number: [de-identified]  : [id number]  Age: 80 years  Gender: Female  Status: Inpatient  Location: Bedside  Height: 67 in  Weight: 143 lb  BP: 135/ 66 mmHg    Indications: CM  Diagnoses: I42 9 - Cardiomyopathy, unspecified    Sonographer:  GERRY Roberts  Referring Physician:  Jonh Garrido PA-C  Group:  [facility]  Cardiology Fellow:  Laine Rodriguez DO  Interpreting Physician:  Yris Thomas MD    SUMMARY    LEFT VENTRICLE:  The ventricle was mildly dilated  Systolic function was markedly reduced  Ejection fraction was estimated to be 25 %  There was moderate diffuse hypokinesis  There was severe hypokinesis of the mid-apical anterior, mid anteroseptal, and apical wall(s)  The changes were consistent with concentric remodeling (increased wall thickness with normal wall mass)  Features were consistent with a pseudonormal left ventricular filling pattern, with concomitant abnormal relaxation and increased filling pressure (grade 2 diastolic dysfunction)  Doppler parameters were consistent with elevated mean left atrial filling pressure  RIGHT VENTRICLE:  The size was normal   Systolic function was normal     MITRAL VALVE:  There was mild to moderate annular calcification  There was mild regurgitation  AORTIC VALVE:  There was no evidence for stenosis      TRICUSPID VALVE:  There was mild regurgitation  IVC, HEPATIC VEINS:  The inferior vena cava was mildly dilated  The respirophasic change in diameter was less than 50%  HISTORY: PRIOR HISTORY: CAD;CHF  PROCEDURE: The procedure was performed at the bedside  This was a routine study  The transthoracic approach was used  The study included complete 2D imaging, M-mode, complete spectral Doppler, and color Doppler  The heart rate was 110 bpm,  at the start of the study  This was a technically difficult study  LEFT VENTRICLE: The ventricle was mildly dilated  Systolic function was markedly reduced  Ejection fraction was estimated to be 25 %  There was moderate diffuse hypokinesis  There was severe hypokinesis of the mid-apical anterior, mid  anteroseptal, and apical wall(s)  Wall thickness was normal  The changes were consistent with concentric remodeling (increased wall thickness with normal wall mass)  DOPPLER: Features were consistent with a pseudonormal left ventricular  filling pattern, with concomitant abnormal relaxation and increased filling pressure (grade 2 diastolic dysfunction)  Doppler parameters were consistent with elevated mean left atrial filling pressure  RIGHT VENTRICLE: The size was normal  Systolic function was normal  Wall thickness was normal     LEFT ATRIUM: Size was normal     RIGHT ATRIUM: Size was normal  A pacing wire was present  MITRAL VALVE: There was mild to moderate annular calcification  There was normal leaflet separation  DOPPLER: The transmitral velocity was within the normal range  There was no evidence for stenosis  There was mild regurgitation  AORTIC VALVE: The valve was trileaflet  Leaflets exhibited mild to moderate calcification, normal cuspal separation, and sclerosis  DOPPLER: Transaortic velocity was within the normal range  There was no evidence for stenosis  There was no  regurgitation      TRICUSPID VALVE: The valve structure was normal  There was normal leaflet separation  DOPPLER: The transtricuspid velocity was within the normal range  There was no evidence for stenosis  There was mild regurgitation  Estimated peak PA  pressure was 45 mmHg  The findings suggest mild pulmonary hypertension  PULMONIC VALVE: Leaflets exhibited normal thickness, no calcification, and normal cuspal separation  DOPPLER: The transpulmonic velocity was within the normal range  There was trace regurgitation  PERICARDIUM: There was no pericardial effusion  The pericardium was normal in appearance  AORTA: The root exhibited normal size  SYSTEMIC VEINS: IVC: The inferior vena cava was normal in size and course  The inferior vena cava was mildly dilated  The respirophasic change in diameter was less than 50%      SYSTEM MEASUREMENT TABLES    2D  %FS: 30 77 %  Ao Diam: 2 39 cm  EDV(Teich): 154 23 ml  EF(Teich): 57 71 %  ESV(Teich): 65 23 ml  IVSd: 0 87 cm  LA Area: 14 86 cm2  LA Diam: 3 53 cm  LVEDV MOD A4C: 92 56 ml  LVEF MOD A4C: 47 %  LVESV MOD A4C: 49 06 ml  LVIDd: 5 61 cm  LVIDs: 3 88 cm  LVLd A4C: 7 45 cm  LVLs A4C: 6 22 cm  LVPWd: 0 87 cm  RA Area: 10 9 cm2  RVIDd: 2 72 cm  SV MOD A4C: 43 5 ml  SV(Teich): 89 01 ml    CW  TR Vmax: 2 78 m/s  TR maxP 98 mmHg    MM  TAPSE: 2 02 cm    PW  E' Sept: 0 03 m/s  E/E' Sept: 27 02  MV A [place]: 0 54 m/s  MV Dec [place]: 2 56 m/s2  MV DecT: 295 03 ms  MV E [place]: 0 76 m/s  MV E/A Ratio: 1 41  MV PHT: 85 56 ms  MVA By PHT: 2 57 cm2    [employer] Accredited Echocardiography Laboratory    Prepared and electronically signed by    Sangeetha Jade MD  Signed 17-May-2021 11:19:55         EKG/telemetry:  Normal sinus rhythm, no ventricular arrhythmias noted    VTE Pharmacologic Prophylaxis: Heparin subQ

## 2021-05-19 NOTE — UTILIZATION REVIEW
Continued Stay Review  Date: 21                Current Patient Class: Inpatient    Current Level of Care: Critical Care - Telemetry at 1051    HPI:   79 y/o female with PMHx CAD s/p JESSIKA x3, h/o cardiac arrest in 2020, ICM, CHF with LVEFx 30-40 %, DM2  Admitted 21 2nd scar mediated VT Storm - shocked 42 times, received 150 amio x 2, 200mg lidocaine, started on lidocaine and amiodarone infusions, Esmolol added and intubated for airway protection    21 Addendum:  Extubated    21: No episodes of VT since admission  Patient was extubated yesterday  She tolerated wean off of Esmolol and decrease in Lidocaine, repeat CTH showed stable hyperdense lesion more likely calcification than blood     21:  NO episodes of VT > 48 hours  In SR on Telemetry  IV amiodarone gtt d/c - transitioned fully to oral amiodarone today  Also on and mexiletine 150  mg tid and metoprolol 25 mg bid  Required quick escalation from room air to 12 L midflow with associated SOB  CXR with possible RLL infiltrate vs volume overload  Received 20mg lasix then able to wean to 4L  BMP today with potassium of 3 6 and creatinine of 1 05  Will supplement potassium  Vital Signs:     21 0230 21 0238 21 0330 21 0430   BP: 125/63   118/53 121/61   Pulse: 64   62 62   Resp: 20   (!) 23 20   Temp:       98 7 °F (37 1 °C)   TempSrc:       Oral   SpO2: 99% 98% 99% 100%   Weight:           Height:                Temp (24hrs), Av 6 °F (37 °C), Min:98 2 °F (36 8 °C), Max:98 8 °F (37 1 °C)  Current: Temperature: 98 7 °F (37 1 °C)    Arterial Line BP: 130/50  Arterial Line MAP (mmHg): 80 mmHg    I/O         0701 -  0700  07 -  0700     P  O  620 1160     I V  (mL/kg) 1340 5 (19 4) 345 2 (5)     NG/GT 90       Total Intake(mL/kg) 2050 5 (29 6) 1505 2 (21 8)     Urine (mL/kg/hr) 1585 (1) 725 (0 4)     Emesis/NG output 50       Total Output 1635 725     Net +415 5 +780  2               Unmeasured Urine Occurrence   1 x      Height and Weights   Height: 5' 7" (170 2 cm)  IBW (Ideal Body Weight): 61 6 kg  Body mass index is 23 89 kg/m²      Weight (last 2 days)      Date/Time   Weight     05/18/21 0533    69 2 (152 56)     05/17/21 0600    70 6 (155 65)     Pertinent Labs/Diagnostic Results:     Results from last 7 days   Lab Units 05/19/21  0542 05/18/21  0519 05/17/21  0507 05/16/21  2355 05/16/21  2319   WBC Thousand/uL 15 13* 13 52* 16 13* 17 64*  --    HEMOGLOBIN g/dL 11 9 10 9* 12 1 12 5  --    I STAT HEMOGLOBIN g/dl  --   --   --   --  12 9   HEMATOCRIT % 38 2 33 9* 37 3 39 4  --    HEMATOCRIT, ISTAT %  --   --   --   --  38   PLATELETS Thousands/uL 211 214 303 274  --    NEUTROS ABS Thousands/µL 12 93* 10 84* 12 92*  --   --        Results from last 7 days   Lab Units 05/19/21  0542 05/18/21  0519 05/17/21  0507 05/16/21  2355 05/16/21  2319 05/16/21  2123   SODIUM mmol/L 138 133* 137 137  --  141   POTASSIUM mmol/L 3 6 4 3 5 2 4 5  --  5 6*   CHLORIDE mmol/L 107 106 110* 109*  --  111*   CO2 mmol/L 21 20* 20* 17*  --  21   CO2, I-STAT mmol/L  --   --   --   --  19*  --    ANION GAP mmol/L 10 7 7 11  --  9   BUN mg/dL 20 27* 38* 38*  --  37*   CREATININE mg/dL 1 05 1 05 1 44* 1 48*  --  1 53*   EGFR ml/min/1 73sq m 54 54 37 36  --  34   CALCIUM mg/dL 9 1 9 0 10 3* 11 1*  --  10 2*   CALCIUM, IONIZED mmol/L  --  1 18 1 30 1 45*  --   --    MAGNESIUM mg/dL 1 7 2 4 4 1* 5 4*  --  2 8*   PHOSPHORUS mg/dL 3 6 4 2* 3 3 3 7  --  4 1     Results from last 7 days   Lab Units 05/19/21  0542 05/18/21  0519 05/17/21  0507   AST U/L 62* 103* 103*   ALT U/L 87* 108* 142*   ALK PHOS U/L 69 68 89   TOTAL PROTEIN g/dL 6 4 6 0* 6 3*   ALBUMIN g/dL 2 8* 2 9* 3 3*   TOTAL BILIRUBIN mg/dL 0 75 0 54 1 00   BILIRUBIN DIRECT mg/dL 0 24* 0 15  --      Results from last 7 days   Lab Units 05/19/21  1125 05/19/21  0550 05/18/21  2043 05/18/21  1635 05/18/21  1103 05/18/21  0525 05/18/21  0000 05/17/21  2110 05/17/21  1733 05/17/21  1127 05/17/21  0610 05/17/21  0204   POC GLUCOSE mg/dl 149* 122 124 186* 143* 137 151* 146* 153* 125 137 192*     Results from last 7 days   Lab Units 05/19/21  0542 05/18/21  0519 05/17/21  0507 05/16/21  2355 05/16/21  2123 05/16/21  1743   GLUCOSE RANDOM mg/dL 130 158* 178* 295* 132 171*     Results from last 7 days   Lab Units 05/17/21  1313 05/17/21  1042 05/17/21  0747 05/17/21  0001 05/16/21 2123   TROPONIN I ng/mL 0 79* 0 83* 0 76* 0 17* 0 10*          Diet Adam/CHO Controlled; Consistent Carbohydrate Diet Level 2 (5 carb servings/75 grams CHO/meal); Sodium 2 GM    Scheduled Medications:  amiodarone, 200 mg, Oral, BID START 5/19/21 at 2100  aspirin, 81 mg, Per NG Tube, Daily  atorvastatin, 80 mg, Per NG Tube, Daily With Dinner  ezetimibe, 10 mg, Per NG Tube, Daily  heparin (porcine), 5,000 Units, Subcutaneous, Q8H [address]  insulin lispro, 1-5 Units, Subcutaneous, TID AC  metoprolol tartrate, 25 mg, Oral, Q12H MARI  mexiletine, 150 mg, Oral, Q8H MARI  senna-docusate sodium, 1 tablet, Oral, HS  tetanus-diphtheria-acellular pertussis, 0 5 mL, Intramuscular, Once  ticagrelor, 60 mg, Per NG Tube, Q12H [address]    Continuous IV Infusions:  IVF amiodarone (CORDARONE) 900 mg in dextrose 5 % 500 mL infusion    Ordered Dose: 0 5 mg/min Route: Intravenous Frequency: Continuous @ 16 7 mL/hr   Scheduled Start Date/Time: 05/16/21 2045 End Date/Time: 05/19/21 0947        PRN Meds:  acetaminophen, 650 mg, Per NG Tube, Q6H PRN GIVEN X 3/ 24 HRS  metoclopramide, 10 mg, Intravenous, Q6H PRN GIVEN X 1/ 24 HRS     Discharge Plan: To be determined   Inpatient Case Management following for all discharge needs    Network Utilization Review Department  ATTENTION: Please call with any questions or concerns to [phone] and carefully listen to the prompts so that you are directed to the right person   All voicemails are confidential   [facility] all requests for admission clinical reviews, approved or denied determinations and any other requests to dedicated fax number below belonging to the campus where the patient is receiving treatment   List of dedicated fax numbers for the Facilities:  [address] [place] [address] DENIALS (Administrative/Medical Necessity) [phone]   [address] (Maternity/NICU/Pediatrics) [phone] [address] [place] [address] [facility] Britta [address]85 [id number] [place] [address] Cecily Linares 1481 [address] [place] [address] [phone]

## 2021-05-19 NOTE — NURSING NOTE
Pt  Acute onset SOB, dizziness, no signs of distress  O2 remained 82%, titrated up NC  [facility] Nemiah Crigler PA-C bedside  CXR ordered  Pt placed on midflow  Will continue to monitor

## 2021-05-19 NOTE — ASSESSMENT & PLAN NOTE
Wt Readings from Last 3 Encounters:   05/18/21 69 2 kg (152 lb 8 9 oz)     · Ischemic cardiomyopathy w/ LVEF 45% in 2020, now down to 25% suspected to be 2/2 myocardial stunning  · Daily weights  · Lasix as needed - goal slightly negative fluid balance

## 2021-05-19 NOTE — PROGRESS NOTES
[address] [facility]  Progress Note - [address] 1950, 80 y o  female MRN: [id number]  Unit/Bed#: [facility] 024-79 Encounter: [id number]  Primary Care Provider: No primary care provider on file  Date and time admitted to hospital: 5/16/2021  5:32 PM    * Ventricular tachycardia [facility])  Assessment & Plan  · Presented to the ER on 5/17, found to be in VT storm  · Shocked 42 times, received 150 amio x 2, 200mg lidocaine, started on lidocaine and amiodarone infusions  Esmolol added  · Intubated for airway protection  · Weaned off lidocaine, esmolol  Amiodarone at 0 5  · Continue mexiletine, lopressor, amiodarone PO  · Appreciate HF recommendations    Acute respiratory failure with hypoxia (HCC)  Assessment & Plan  · Overnight, patient quickly escalated from room air to 12L midflow     · CXR w/ possible RLL infiltrate (?aspiration) vs volume overload  · Received 20mg lasix x 1 with good response  · Weaned to 10L midflow  · Encourage IS, pulmonary toilet    Chronic systolic heart failure (HCC)  Assessment & Plan  Wt Readings from Last 3 Encounters:   05/18/21 69 2 kg (152 lb 8 9 oz)     · Ischemic cardiomyopathy w/ LVEF 45% in 2020, now down to 25% suspected to be 2/2 myocardial stunning  · Daily weights  · Lasix as needed - goal slightly negative fluid balance        CAD (coronary artery disease)  Assessment & Plan  · CAD s/p JESSIKA   · ASA/Brillinta  · Statin    YOLIS (acute kidney injury) ([facility] [address] )  Assessment & Plan  · Improved, trend I/Os    Scalp laceration  Assessment & Plan  · S/p repair by trauma team  · Suture removal 5/27    Hyperlipidemia  Assessment & Plan  · Statin    Type 2 diabetes mellitus [facility])  Assessment & Plan  No results found for: HGBA1C    Recent Labs     05/18/21  0525 05/18/21  1103 05/18/21  1635 05/18/21  2043   POCGLU 137 143* 186* 124       Blood Sugar Average: Last 72 hrs:  (P) 149 4 · SSI    ----------------------------------------------------------------------------------------  HPI/24hr events: Required quick escalation from room air to 12L midflow with associate SOB  CXR with possible RLL infiltrate vs volume overload  Received 20mg lasix  Weaned to 4L  Disposition: Transfer to Med Surg with Telemetry   Code Status: Level 1 - Full Code  ---------------------------------------------------------------------------------------  SUBJECTIVE  "I'm feeling better than before"    Review of Systems  Review of systems was reviewed and negative unless stated above in HPI/24-hour events   ---------------------------------------------------------------------------------------  OBJECTIVE    Vitals   Vitals:    21 0230 21 0238 21 0330 21 0430   BP: 125/63  118/53 121/61   Pulse: 64  62 62   Resp: 20  (!) 23 20   Temp:    98 7 °F (37 1 °C)   TempSrc:    Oral   SpO2: 99% 98% 99% 100%   Weight:       Height:         Temp (24hrs), Av 6 °F (37 °C), Min:98 2 °F (36 8 °C), Max:98 8 °F (37 1 °C)  Current: Temperature: 98 7 °F (37 1 °C)  Arterial Line BP: 130/50  Arterial Line MAP (mmHg): 80 mmHg    Respiratory:  SpO2: SpO2: 100 %       Invasive/non-invasive ventilation settings   Respiratory    Lab Data (Last 4 hours)    None         O2/Vent Data (Last 4 hours)       0238          Non-Invasive Ventilation Mode [address] [facility] (Mid flow)                   Physical Exam  Vitals signs and nursing note reviewed  Constitutional:       General: She is not in acute distress  Appearance: Normal appearance  Interventions: Nasal cannula in place  HENT:      Head: Normocephalic and atraumatic  Comments: Facial flushing  Cardiovascular:      Rate and Rhythm: Normal rate and regular rhythm  Pulses:           Radial pulses are 2+ on the right side and 2+ on the left side  Dorsalis pedis pulses are 2+ on the right side and 2+ on the left side        Heart sounds: Normal heart sounds  Heart sounds not distant  No murmur  No friction rub  No gallop  Pulmonary:      Effort: Pulmonary effort is normal       Breath sounds: Normal breath sounds  No decreased breath sounds, wheezing, rhonchi or rales  Abdominal:      General: There is no distension  Palpations: Abdomen is soft  Tenderness: There is no abdominal tenderness  Musculoskeletal:      Right lower leg: No edema  Left lower leg: No edema  Skin:     General: Skin is warm and dry  Neurological:      General: No focal deficit present  Mental Status: She is alert and oriented to person, place, and time  GCS: GCS eye subscore is 4  GCS verbal subscore is 5  GCS motor subscore is 6  Psychiatric:         Behavior: Behavior is cooperative           Laboratory and Diagnostics:  Results from last 7 days   Lab Units 05/18/21  0519 05/17/21  0507 05/16/21 2355 05/16/21 2319 05/16/21  1744 05/16/21  1743   WBC Thousand/uL 13 52* 16 13* 17 64*  --   --  14 29*   HEMOGLOBIN g/dL 10 9* 12 1 12 5  --   --  13 6   I STAT HEMOGLOBIN g/dl  --   --   --  12 9 15 0  --    HEMATOCRIT % 33 9* 37 3 39 4  --   --  42 6   HEMATOCRIT, ISTAT %  --   --   --  38 44  --    PLATELETS Thousands/uL 214 303 274  --   --  322   NEUTROS PCT % 81* 80*  --   --   --  58   MONOS PCT % 6 6  --   --   --  5     Results from last 7 days   Lab Units 05/18/21  0519 05/17/21 0507 05/16/21 2355 05/16/21 2319 05/16/21 2123 05/16/21  1744 05/16/21  1743   SODIUM mmol/L 133* 137 137  --  141  --  137   POTASSIUM mmol/L 4 3 5 2 4 5  --  5 6*  --  5 0   CHLORIDE mmol/L 106 110* 109*  --  111*  --  104   CO2 mmol/L 20* 20* 17*  --  21  --  19*   CO2, I-STAT mmol/L  --   --   --  19*  --  19*  --    ANION GAP mmol/L 7 7 11  --  9  --  14*   BUN mg/dL 27* 38* 38*  --  37*  --  42*   CREATININE mg/dL 1 05 1 44* 1 48*  --  1 53*  --  1 83*   CALCIUM mg/dL 9 0 10 3* 11 1*  --  10 2*  --  10 0   GLUCOSE RANDOM mg/dL 158* 178* 295*  --  132  -- 171*   ALT U/L 108* 142*  --   --   --   --   --    AST U/L 103* 103*  --   --   --   --   --    ALK PHOS U/L 68 89  --   --   --   --   --    ALBUMIN g/dL 2 9* 3 3*  --   --   --   --   --    TOTAL BILIRUBIN mg/dL 0 54 1 00  --   --   --   --   --      Results from last 7 days   Lab Units 05/18/21  0519 05/17/21  0507 05/16/21  2355 05/16/21  2123   MAGNESIUM mg/dL 2 4 4 1* 5 4* 2 8*   PHOSPHORUS mg/dL 4 2* 3 3 3 7 4 1      Results from last 7 days   Lab Units 05/16/21  2355   INR  1 06      Results from last 7 days   Lab Units 05/17/21  1313 05/17/21  1042 05/17/21  0747 05/17/21  0001 05/16/21 2123 05/16/21  1743   TROPONIN I ng/mL 0 79* 0 83* 0 76* 0 17* 0 10* <0 02     Results from last 7 days   Lab Units 05/17/21  0206 05/16/21  2355   LACTIC ACID mmol/L 2 0 2 2*     ABG:  Results from last 7 days   Lab Units 05/17/21  0747   PH ART  7 504*   PCO2 ART mm Hg 23 5*   PO2 ART mm Hg 228 4*   HCO3 ART mmol/L 18 1*   BASE EXC ART mmol/L -3 3   ABG SOURCE  Line, Arterial     VBG:  Results from last 7 days   Lab Units 05/17/21  0747  05/17/21  0206   PH KELLIE   --   --  7 433*   PCO2 KELLIE mm Hg  --   --  33 2*   PO2 KELLIE mm Hg  --   --  34 5*   HCO3 KELLIE mmol/L  --   --  21 7*   BASE EXC KELLIE mmol/L  --   --  -1 8   ABG SOURCE  Line, Arterial   < > Line, Arterial    < > = values in this interval not displayed  Imaging: CXR: RLL infiltrate I have personally reviewed pertinent reports  Intake and Output  I/O       05/17 0701 - 05/18 0700 05/18 0701 - 05/19 0700    P  O  620 1160    I V  (mL/kg) 1340 5 (19 4) 345 2 (5)    NG/GT 90     Total Intake(mL/kg) 2050 5 (29 6) 1505 2 (21 8)    Urine (mL/kg/hr) 1585 (1) 725 (0 4)    Emesis/NG output 50     Total Output 1635 725    Net +415 5 +780 2          Unmeasured Urine Occurrence  1 x          Height and Weights   Height: 5' 7" (170 2 cm)  IBW (Ideal Body Weight): 61 6 kg  Body mass index is 23 89 kg/m²    Weight (last 2 days)     Date/Time   Weight    05/18/21 0579 69 2 (152 56)    05/17/21 0600   70 6 (155 65)                Nutrition       Diet Orders   (From admission, onward)             Start     Ordered    05/17/21 1425  Diet Adam/CHO Controlled; Consistent Carbohydrate Diet Level 2 (5 carb servings/75 grams CHO/meal); Sodium 2 GM  Diet effective now     Question Answer Comment   Diet Type Adam/CHO Controlled    Adam/CHO Controlled Consistent Carbohydrate Diet Level 2 (5 carb servings/75 grams CHO/meal)    Other Restriction(s): Sodium 2 GM    RD to adjust diet per protocol?  Yes        05/17/21 1424                  Active Medications  Scheduled Meds:  Current Facility-Administered Medications   Medication Dose Route Frequency Provider Last Rate    acetaminophen  650 mg Per NG Tube Q6H PRN Sydni aFrmer PA-C      amiodarone  0 5 mg/min Intravenous Continuous Linn SmokeDEVON 0 5 mg/min (05/19/21 0131)    amiodarone  400 mg Oral BID Linn SmokeDEVON      aspirin  81 mg Per NG Tube Daily Sydni Farmer PA-C      atorvastatin  80 mg Per NG Tube Daily With Reina Hartmann PA-C      ezetimibe  10 mg Per NG Tube Daily Sydni Farmer PA-C      heparin (porcine)  5,000 Units Subcutaneous Q8H [address] Mariangel Nelson PA-C      insulin lispro  1-5 Units Subcutaneous TID AC Mariangel Nelson PA-C      metoclopramide  10 mg Intravenous Q6H PRN Cookie Cramer PA-C      metoprolol tartrate  25 mg Oral Q12H [address] Mariangel Little PA-C      mexiletine  150 mg Oral Q8H [address] Mariangel Little PA-C      senna-docusate sodium  1 tablet Oral HS Mariangel Nelson PA-C      tetanus-diphtheria-acellular pertussis  0 5 mL Intramuscular Once Nella Guaman MD      ticagrelor  60 mg Per NG Tube Q12H [address] Frances Huang PA-C       Continuous Infusions:  amiodarone, 0 5 mg/min, Last Rate: 0 5 mg/min (05/19/21 0131)      PRN Meds:   acetaminophen, 650 mg, Q6H PRN  metoclopramide, 10 mg, Q6H PRN        Invasive Devices Review  Invasive Devices Peripheral Intravenous Line            Peripheral IV 05/16/21 Left Forearm 2 days    Peripheral IV 05/16/21 Right Arm 2 days                ---------------------------------------------------------------------------------------  Advance Directive and Living Will:      Power of :    POLST:    ---------------------------------------------------------------------------------------  Care Time Delivered:   No Critical Care time spent       Brain Aviles PA-C

## 2021-05-19 NOTE — CASE MANAGEMENT
ABBIE spoke with [address] [place] [address]   they called to provide discharge assistance if needed  She provided the contact # [phone] to call if any assistance needed

## 2021-05-19 NOTE — ASSESSMENT & PLAN NOTE
No results found for: HGBA1C    Recent Labs     05/18/21  0525 05/18/21  1103 05/18/21  1635 05/18/21  2043   POCGLU 137 143* 186* 124       Blood Sugar Average: Last 72 hrs:  (P) 149 4     · SSI

## 2021-05-19 NOTE — PROGRESS NOTES
Cardiology Progress Note - Baljinder Middleton 80 y o  female MRN: [id number]    Unit/Bed#: [facility] 449-68 Encounter: [id number]      Assessment:  Principal Problem:    Ventricular tachycardia (HCC)  Active Problems:    Chronic systolic heart failure (HCC)    Hyperlipidemia    CAD (coronary artery disease)    [facility]    YOLIS (acute kidney injury) ([facility] )    Scalp laceration    Acute respiratory failure with hypoxia ([facility] )      Plan:  Patient comfortable today  She has no chest pain or significant dyspnea  She is in sinus rhythm on telemetry  She continues on intravenous amiodarone and is well is on oral therapy  Will discontinue intravenous amiodarone when okay with EP service  Continues on mexiletine and metoprolol tartrate  BMP today with potassium of 3 6 and creatinine of 1 05  Will supplement potassium  Subjective:   Patient seen and examined  No significant events overnight   negative  Objective:     Vitals: Blood pressure 116/63, pulse 66, temperature 98 6 °F (37 °C), temperature source Oral, resp  rate 20, height 5' 7" (1 702 m), weight 69 2 kg (152 lb 8 9 oz), SpO2 97 %  , Body mass index is 23 89 kg/m² ,   Orthostatic Blood Pressures      Most Recent Value   Blood Pressure  116/63 filed at 05/19/2021 0734   Patient Position - Orthostatic VS  Sitting filed at 05/16/2021 2045      ,      Intake/Output Summary (Last 24 hours) at 5/19/2021 0937  Last data filed at 5/19/2021 0601  Gross per 24 hour   Intake 1317 46 ml   Output 2675 ml   Net -1357 54 ml       No significant arrhythmias seen on telemetry review         Physical Exam:    GEN: Baljinder Middleton appears well, alert and oriented x 3, pleasant and cooperative   NECK: supple, no carotid bruits, no JVD or HJR  HEART: normal rate, regular rhythm, normal S1 and S2, no murmurs, clicks, gallops or rubs   LUNGS: clear to auscultation bilaterally; no wheezes, rales, or rhonchi   ABDOMEN: normal bowel sounds, soft, no tenderness, no distention  EXTREMITIES: peripheral pulses normal; no clubbing, cyanosis, or edema  SKIN: warm and well perfused, no suspicious lesions on exposed skin    Labs & Results:    No results displayed because visit has over 200 results  Xr Chest Portable    Result Date: 5/19/2021  Narrative: CHEST INDICATION:   sob  COMPARISON:  05/16/2021 EXAM PERFORMED/VIEWS:  XR CHEST PORTABLE 1 image FINDINGS: Cardiomediastinal silhouette appears unremarkable  A defibrillator enters on the left  No definite infiltrates  Mild vascular congestion is suggested  No pneumothorax or pleural effusion  Osseous structures appear within normal limits for patient age  Impression: Mild vascular congestion is suggested  Correlate for mild CHF  Workstation performed: [id number]     Xr Chest 1 View Portable    Result Date: 5/17/2021  Narrative: CHEST INDICATION:   code  Cardiac arrest COMPARISON:  None EXAM PERFORMED/VIEWS:  XR CHEST PORTABLE Images: 2 FINDINGS: Cardiomediastinal silhouette appears unremarkable  An endotracheal tube lies in satisfactory position  An enteric tube extends into the stomach  A defibrillator enters on the left  A right IJ line is present with its tip at the junction of the SVC and right atrium  External monitoring devices are present  The lungs are clear  No pneumothorax or pleural effusion  Suspected skin folds overlying the lateral right lung field  Osseous structures appear within normal limits for patient age  Impression: No acute cardiopulmonary disease  Workstation performed: [id number]     Ct Head Wo Contrast    Result Date: 5/18/2021  Narrative: CT BRAIN - WITHOUT CONTRAST INDICATION:   Cerebral hemorrhage suspected f/u possible falx hemorrhage  COMPARISON:  Head CTs 5/16/2021 TECHNIQUE:  CT examination of the brain was performed  In addition to axial images, sagittal and coronal 2D reformatted images were created and submitted for interpretation   Radiation dose length product (DLP) for this visit:  877 97 mGy-cm   This examination, like all CT scans performed in the [facility], was performed utilizing techniques to minimize radiation dose exposure, including the use of iterative  reconstruction and automated exposure control  IMAGE QUALITY:  Diagnostic  FINDINGS: PARENCHYMA:  No intracranial masslike lesion, mass effect or midline shift  No CT signs of acute infarction  No acute parenchymal hemorrhage  VENTRICLES AND EXTRA-AXIAL SPACES:  Normal for the patient's age  Tiny interhemispheric anterior falcine linear hyperdensity is stable measuring 5 mm craniocaudally (series 400 image 32)  VISUALIZED ORBITS AND PARANASAL SINUSES:  Unremarkable  CALVARIUM AND EXTRACRANIAL SOFT TISSUES:  Normal      Impression: Stable size and configuration of 5 mm anterior interhemispheric falcine hyperdensity  Stability suggests calcification over hemorrhage  Workstation performed: [id number]     Ct Head Wo Contrast    Result Date: 5/16/2021  Narrative: CT BRAIN - WITHOUT CONTRAST INDICATION:  Code  COMPARISON:  CT head earlier today TECHNIQUE:  CT examination of the brain was performed  In addition to axial images, sagittal and coronal 2D reformatted images were created and submitted for interpretation  Radiation dose length product (DLP) for this visit:  674 39 mGy-cm  This examination, like all CT scans performed in the [facility], was performed utilizing techniques to minimize radiation dose exposure, including the use of iterative reconstruction and automated exposure control  IMAGE QUALITY:  Diagnostic  FINDINGS: PARENCHYMA:  No intracranial mass, mass effect or midline shift  No CT signs of acute infarction  No acute parenchymal hemorrhage  VENTRICLES AND EXTRA-AXIAL SPACES:  There is a 5 mm linear hyperdensity seen along the anterior interhemispheric falx (series 2/21)    Unclear if this is related to calcification of the interhemispheric falx, tiny subdural bleed or a calcified vessel  This is probably unchanged from the recent CT study in retrospect  Otherwise, no evidence of extra-axial fluid collections  The ventricles are normal size, midline in position  VISUALIZED ORBITS AND PARANASAL SINUSES:  Unremarkable  CALVARIUM AND EXTRACRANIAL SOFT TISSUES:  Mild right parietal scalp soft tissue swelling  No calvarial fracture  Impression: 5 mm linear hyperdensity along the anterior interhemispheric falx could represent calcification of the interhemispheric falx, tiny subdural bleed or a calcified vessel  This is unchanged  Follow-up CT head in 24 hours recommended  Otherwise, no acute intracranial abnormality  The study was marked in [facility] for immediate notification  Workstation performed: [id number]     Trauma - Ct Head Wo Contrast    Result Date: 5/16/2021  Narrative: CT BRAIN - WITHOUT CONTRAST INDICATION:   TRAUMA  COMPARISON:  None  TECHNIQUE:  CT examination of the brain was performed  In addition to axial images, sagittal and coronal 2D reformatted images were created and submitted for interpretation  Radiation dose length product (DLP) for this visit:  892 47 mGy-cm   This examination, like all CT scans performed in the [facility], was performed utilizing techniques to minimize radiation dose exposure, including the use of iterative  reconstruction and automated exposure control  IMAGE QUALITY:  Diagnostic  FINDINGS: PARENCHYMA:  No intracranial mass, mass effect or midline shift  No CT signs of acute infarction  No acute parenchymal hemorrhage  VENTRICLES AND EXTRA-AXIAL SPACES:  Normal for the patient's age  VISUALIZED ORBITS AND PARANASAL SINUSES:  Unremarkable  CALVARIUM AND EXTRACRANIAL SOFT TISSUES:  Normal      Impression: No acute intracranial abnormality   I personally discussed this study with trauma team at [phone] on 5/16/2021 at 5:57 PM  Workstation performed: [id number]     Trauma - Ct Spine Cervical Wo Contrast    Result Date: 5/16/2021  Narrative: CT CERVICAL SPINE - WITHOUT CONTRAST INDICATION:   TRAUMA  COMPARISON:  None  TECHNIQUE:  CT examination of the cervical spine was performed without intravenous contrast   Contiguous axial images were obtained  Sagittal and coronal reconstructions were performed  Radiation dose length product (DLP) for this visit:  386 6 mGy-cm   This examination, like all CT scans performed in the [facility], was performed utilizing techniques to minimize radiation dose exposure, including the use of iterative reconstruction and automated exposure control  IMAGE QUALITY:  Diagnostic  FINDINGS: ALIGNMENT:  Normal alignment of the cervical spine  No subluxation  VERTEBRAL BODIES:  No fracture  DEGENERATIVE CHANGES:  Degenerative disc changes with loss of disc and at C5-C6, C6-C7 with anterior osteophytes PREVERTEBRAL AND PARASPINAL SOFT TISSUES:  Unremarkable  THORACIC INLET:  Normal      Impression: No cervical spine fracture or traumatic malalignment  Degenerative changes from C5 to C7  I personally discussed this study with trauma team at [phone] on 5/16/2021 at 5:57 PM  Workstation performed: [id number]     Xr Chest 1 View    Result Date: 5/17/2021  Narrative: TRAUMA SERIES INDICATION:  Trauma level B, fall  COMPARISON:  None VIEWS:  XR TRAUMA MULTIPLE  FINDINGS: CHEST: Supine frontal view of the chest is obtained  ICD transvenous pacemaker tip in right ventricle  Lungs are clear  No layering pleural effusions detected  No pneumothorax is seen on this supine film  Upright images are more sensitive to detect anterior pneumothoraces if relevant  No displaced fractures  Impression: No acute pulmonary disease Workstation performed: [id number]     Xr Trauma Multiple (b/ra Trauma [place] Only)    Result Date: 5/17/2021  Narrative: TRAUMA SERIES INDICATION:  Trauma level B, fall   COMPARISON:  None VIEWS:  XR TRAUMA MULTIPLE  FINDINGS: CHEST: Supine frontal view of the chest is obtained  ICD transvenous pacemaker tip in right ventricle  Lungs are clear  No layering pleural effusions detected  No pneumothorax is seen on this supine film  Upright images are more sensitive to detect anterior pneumothoraces if relevant  No displaced fractures  Impression: No acute pulmonary disease Workstation performed: [id number]      Bedside Procedure    Result Date: 5/16/2021  Narrative: 1 2 [id number] [id number]  8 [id number]  9 [id number]  1417      EKG personally reviewed by Erin Zhao MD      Counseling / Coordination of Care  Total floor / unit time spent today 30 minutes  Greater than 50% of total time was spent with the patient and / or family counseling and / or coordination of care

## 2021-05-19 NOTE — ASSESSMENT & PLAN NOTE
· Overnight, patient quickly escalated from room air to 12L midflow     · CXR w/ possible RLL infiltrate (?aspiration) vs volume overload  · Received 20mg lasix x 1 with good response  · Weaned to 10L midflow  · Encourage IS, pulmonary toilet

## 2021-05-19 NOTE — ASSESSMENT & PLAN NOTE
· Presented to the ER on 5/17, found to be in VT storm  · Shocked 42 times, received 150 amio x 2, 200mg lidocaine, started on lidocaine and amiodarone infusions  Esmolol added  · Intubated for airway protection  · Weaned off lidocaine, esmolol   Amiodarone at 0 5  · Continue mexiletine, lopressor, amiodarone PO  · Appreciate HF recommendations

## 2021-05-20 PROBLEM — N17.9 AKI (ACUTE KIDNEY INJURY) (HCC): Status: RESOLVED | Noted: 2021-01-01 | Resolved: 2021-01-01

## 2021-05-20 PROBLEM — J96.01 ACUTE RESPIRATORY FAILURE WITH HYPOXIA (HCC): Status: RESOLVED | Noted: 2021-01-01 | Resolved: 2021-01-01

## 2021-05-20 NOTE — ASSESSMENT & PLAN NOTE
· Presented to the ER on 5/17, found to be in VT storm  · Shocked 42 times, received 150 amio x 2, 200mg lidocaine, started on lidocaine and amiodarone infusions  Esmolol added  · Intubated for airway protection  · Weaned off lidocaine, esmolol  · Extubated 5/17 successfully  · Continue mexiletine, lopressor, amiodarone PO  · Appreciate HF recommendations  · Closed with electrophysiology, discharge patient on metoprolol 25 mg twice a day, amiodarone 200 mg twice a day, mexiletine 150 mg t i d    · Patient to follow-up with outpatient Cardiology Dr Андрей Gutierrez with [facility], patient is most likely going to need VT ablation, scar modification

## 2021-05-20 NOTE — PHYSICAL THERAPY NOTE
Physical Therapy Evaluation    Patient's Name: Shanti Boyle    Admitting Diagnosis  Syncope [R55]  Ventricular tachycardia ([facility] ) [I47 2]  Injury, unspecified, initial encounter [T14 90XA]  Other injury of unspecified body region, initial encounter [T14  8XXA]    Problem List  Patient Active Problem List   Diagnosis    Ventricular tachycardia (HCC)    Chronic systolic heart failure (HCC)    Hyperlipidemia    CAD (coronary artery disease)    Type 2 diabetes mellitus ([facility] )    Scalp laceration    Acute respiratory failure with hypoxia (HCC)       Past Medical History  Past Medical History:   Diagnosis Date    CHF (congestive heart failure) ([facility] )     Coronary artery disease        Past Surgical History  History reviewed  No pertinent surgical history  05/20/21 0901   PT Last Visit   PT Visit Date 05/20/21   Note Type   Note type Evaluation   Pain Assessment   Pain Assessment Tool 0-10   Pain Score No Pain   Home Living   Type of Home House   Additional Comments Resides w/  in 2 story home  Indep self care, ambulates w/ out device       Prior Function   Level of [place] Independent with ADLs and functional mobility   Falls in the last 6 months 1 to 4   Restrictions/Precautions   Weight Bearing Precautions Per Order No   General   Family/Caregiver Present No   Cognition   Overall Cognitive Status WFL   Arousal/Participation Responsive   Orientation Level Oriented X4   Memory Unable to assess   Following Commands Follows all commands and directions without difficulty   RLE Assessment   RLE Assessment   (strength grossly 4/5)   LLE Assessment   LLE Assessment   (strength grossly 4/5)   Transfers   Sit to Stand 7  Independent   Stand to Sit 7  Independent   Ambulation/Elevation   Gait pattern   (slow, no LOB)   Gait Assistance 7  Independent   Additional items Assist x 1   Assistive Device None   Distance 300'   Balance   Static Sitting Normal   Dynamic Sitting Good   Static Standing Good Dynamic Standing Good   Ambulatory Fair +   Endurance Deficit   Endurance Deficit No   Activity Tolerance   Activity Tolerance Patient tolerated treatment well   Nurse Made Aware yes   Assessment   Prognosis Good   Assessment Pt seen for high complexity physical therapy evaluation  Pt is a 81 y/o female w/ history/comorbidities of DM, HTN, ICD, CHF, who is now admitted w/ dizziness, and syncopal episode w/ fall  Noted also to be in v-tach and resp distress on admit- had cardioversion and intubated on admit  Due to acute medical issues, unclear medical dx, pain, fall risk, note unstable clinical picture  PT consulted to assess mobility, d/c needs  At present time, despite acute medical issues, note pt to be functioning close to or at mobility baseline  No continued skilled acute care PT needs identified  will sign off    Pt encouraged to mobilize ad ermias while here, and may d/c home when stable   Goals   PT Treatment Day 0   Plan   PT Frequency   (d/c PT)   Recommendation   PT Discharge Recommendation No rehabilitation needs   PT - OK to Discharge Yes   AM-PAC Basic Mobility Inpatient   Turning in Bed Without Bedrails 4   Lying on Back to Sitting on Edge of Flat Bed 4   Moving Bed to Chair 4   Standing Up From Chair 4   Walk in Room 4   Climb 3-5 Stairs 4   Basic Mobility Inpatient Raw Score 24   Basic Mobility Standardized Score 57 68         Mabel Lambert PT, DPT, CSRS

## 2021-05-20 NOTE — PROGRESS NOTES
Progress Note - Electrophysiology-Cardiology (EP)   Katrin Lizarraga 80 y o  female MRN: [id number]  Unit/Bed#: -01 Encounter: [id number]      Assessment:  1  VT with appropriate ICD therapy as well as successful external shocks, no further episodes              A ) currently on oral amiodarone and mexiletine               B ) [employer] single chamber ICD in situ              C ) maintained on amiodarone 200 mg daily and Toprol 25 mg daily as an outpatient  2  Prior cardiac arrest 1/2020  3  CAD status post JESSIKA to D1, RPL and mRCA 9/2019, maintained on Brilinta and aspirin              A ) known residual left circumflex disease   4  Ischemic cardiomyopathy with LVEF 25% per echo this admission              A ) severe hypokinesis of the mid-apical anterior, mid anteroseptal, and apical wall              B ) maintained on Entresto and Toprol as an outpatient  5  Chronic HFrEF  6  Hypertension  7  Hyperlipidemia  8  Diabetes mellitus type 2      Plan:  She feels well today, telemetry shows no recurrent ventricular arrhythmias or other episodes  She is tolerating her current regimen well  She is stable for discharge at this time from an EP standpoint  Recommend being discharged on amiodarone 200 mg b i d , mexiletine 150 mg t i d , and metoprolol 25 mg b i d     As noted yesterday, Dr Sabra Olmos reached out to Dr Chetna Blount to discuss this current treatment plan and he is in agreement  I asked the patient to contact his office upon discharge to arrange follow-up in the near future  Discussed with primary team       Subjective/Objective   Chief Complaint:  No cardiac complaints    Subjective:  She continues to feel well, she denies chest pain or pressure, palpitations, worsening shortness of breath, dizziness, or lightheadedness      Objective:     Vitals: /53   Pulse 57   Temp 97 9 °F (36 6 °C)   Resp 19   Ht 5' 7" (1 702 m)   Wt 66 1 kg (145 lb 12 8 oz)   SpO2 97%   BMI 22 84 kg/m²   Vitals: 05/19/21 0558 05/20/21 0459   Weight: 69 2 kg (152 lb 8 9 oz) 66 1 kg (145 lb 12 8 oz)     Orthostatic Blood Pressures      Most Recent Value   Blood Pressure  115/53 filed at 05/20/2021 1218   Patient Position - Orthostatic VS  Sitting filed at 05/16/2021 2045            Intake/Output Summary (Last 24 hours) at 5/20/2021 1253  Last data filed at 5/19/2021 1826  Gross per 24 hour   Intake 600 ml   Output 200 ml   Net 400 ml       Invasive Devices     Peripheral Intravenous Line            Peripheral IV 05/20/21 Right;Ventral (anterior) Forearm less than 1 day                            Scheduled Meds:  Current Facility-Administered Medications   Medication Dose Route Frequency Provider Last Rate    acetaminophen  650 mg Per NG Tube Q6H PRN James Fischer PA-C      amiodarone  200 mg Oral BID James Fischer PA-C      aspirin  81 mg Per NG Tube Daily Mariangel E DEVON Nleson      atorvastatin  80 mg Per NG Tube Daily With Russell Michelle PA-C      ezetimibe  10 mg Per NG Tube Daily Mariangel MANSI Nelson PA-C      heparin (porcine)  5,000 Units Subcutaneous Q8H [address] Mariangel E Sterner, PA-MILLER      insulin lispro  1-5 Units Subcutaneous TID AC Mariangelsunni Nelson PA-C      metoclopramide  10 mg Intravenous Q6H PRN James Fischer PA-C      metoprolol tartrate  25 mg Oral Q12H [address] Mariangel E DEVON Nelson      mexiletine  150 mg Oral Q8H [address] Mariangel E DEVON Nelson      sacubitril-valsartan  1 tablet Oral BID Sahra Craft MD      senolayinka-docusate sodium  1 tablet Oral HS Mariangel MANSI Nelson PA-C      tetanus-diphtheria-acellular pertussis  0 5 mL Intramuscular Once Mariangel E DEVON Nelson      ticagrelor  60 mg Per NG Tube Q12H [address] Mariangel E DEVON Nelson       Continuous Infusions:   PRN Meds:   acetaminophen    metoclopramide    Review of Systems   Constitution: Negative for fever and malaise/fatigue     Cardiovascular: Negative for chest pain, dyspnea on exertion, irregular heartbeat, leg swelling, near-syncope, orthopnea, palpitations, paroxysmal nocturnal dyspnea and syncope  All other systems reviewed and are negative  Physical Exam:   GEN: NAD, alert and oriented x 3, well appearing  SKIN: dry without significant lesions or rashes  HEENT: NCAT, PERRL, EOMs intact  NECK: No JVD appreciated  CARDIOVASCULAR: RRR, normal S1, S2 without murmurs, rubs, or gallops appreciated  LUNGS: Clear to auscultation bilaterally without wheezes, rhonchi, or rales  ABDOMEN: Soft, nontender, nondistended  EXTREMITIES/VASCULAR: perfused without clubbing, cyanosis, or LE edema b/l  PSYCH: Normal mood and affect  NEURO: CN ll-Xll grossly intact                Lab Results: I have personally reviewed pertinent lab results  Results from last 7 days   Lab Units 05/20/21 0448 05/19/21  0542 05/18/21  0519   WBC Thousand/uL 9 20 15 13* 13 52*   HEMOGLOBIN g/dL 10 7* 11 9 10 9*   HEMATOCRIT % 34 6* 38 2 33 9*   PLATELETS Thousands/uL 223 211 214     Results from last 7 days   Lab Units 05/20/21  0623 05/19/21  0542 05/18/21  0519  05/16/21  2319   POTASSIUM mmol/L 4 0 3 6 4 3   < >  --    CHLORIDE mmol/L 110* 107 106   < >  --    CO2 mmol/L 24 21 20*   < >  --    CO2, I-STAT mmol/L  --   --   --   --  19*   BUN mg/dL 24 20 27*   < >  --    CREATININE mg/dL 1 11 1 05 1 05   < >  --    GLUCOSE, ISTAT mg/dl  --   --   --   --  215*   CALCIUM mg/dL 9 8 9 1 9 0   < >  --     < > = values in this interval not displayed  Results from last 7 days   Lab Units 05/16/21  2355   INR  1 06     Results from last 7 days   Lab Units 05/20/21 0448 05/19/21  0542 05/18/21  0519   MAGNESIUM mg/dL 2 6 1 7 2 4         Imaging: I have personally reviewed pertinent reports      Results for orders placed during the hospital encounter of 05/16/21   Echo complete with contrast if indicated    Narrative [address]  [address]  [phone]    Transthoracic Echocardiogram  2D, M-mode, Doppler, and Color Doppler    Study date:  17-May-2021    Patient: Sara Mann  MR number: [id number]  Account number: [de-identified]  : [id number]  Age: 80 years  Gender: Female  Status: Inpatient  Location: Bedside  Height: 67 in  Weight: 143 lb  BP: 135/ 66 mmHg    Indications: CM  Diagnoses: I42 9 - Cardiomyopathy, unspecified    Sonographer:  GERRY Garcia  Referring Physician:  Tab Miller PA-C  Group:  [facility]  Cardiology Fellow:  Joby Jimenez DO  Interpreting Physician:  Benja Welch MD    SUMMARY    LEFT VENTRICLE:  The ventricle was mildly dilated  Systolic function was markedly reduced  Ejection fraction was estimated to be 25 %  There was moderate diffuse hypokinesis  There was severe hypokinesis of the mid-apical anterior, mid anteroseptal, and apical wall(s)  The changes were consistent with concentric remodeling (increased wall thickness with normal wall mass)  Features were consistent with a pseudonormal left ventricular filling pattern, with concomitant abnormal relaxation and increased filling pressure (grade 2 diastolic dysfunction)  Doppler parameters were consistent with elevated mean left atrial filling pressure  RIGHT VENTRICLE:  The size was normal   Systolic function was normal     MITRAL VALVE:  There was mild to moderate annular calcification  There was mild regurgitation  AORTIC VALVE:  There was no evidence for stenosis  TRICUSPID VALVE:  There was mild regurgitation  IVC, HEPATIC VEINS:  The inferior vena cava was mildly dilated  The respirophasic change in diameter was less than 50%  HISTORY: PRIOR HISTORY: CAD;CHF  PROCEDURE: The procedure was performed at the bedside  This was a routine study  The transthoracic approach was used  The study included complete 2D imaging, M-mode, complete spectral Doppler, and color Doppler  The heart rate was 110 bpm,  at the start of the study   This was a technically difficult study     LEFT VENTRICLE: The ventricle was mildly dilated  Systolic function was markedly reduced  Ejection fraction was estimated to be 25 %  There was moderate diffuse hypokinesis  There was severe hypokinesis of the mid-apical anterior, mid  anteroseptal, and apical wall(s)  Wall thickness was normal  The changes were consistent with concentric remodeling (increased wall thickness with normal wall mass)  DOPPLER: Features were consistent with a pseudonormal left ventricular  filling pattern, with concomitant abnormal relaxation and increased filling pressure (grade 2 diastolic dysfunction)  Doppler parameters were consistent with elevated mean left atrial filling pressure  RIGHT VENTRICLE: The size was normal  Systolic function was normal  Wall thickness was normal     LEFT ATRIUM: Size was normal     RIGHT ATRIUM: Size was normal  A pacing wire was present  MITRAL VALVE: There was mild to moderate annular calcification  There was normal leaflet separation  DOPPLER: The transmitral velocity was within the normal range  There was no evidence for stenosis  There was mild regurgitation  AORTIC VALVE: The valve was trileaflet  Leaflets exhibited mild to moderate calcification, normal cuspal separation, and sclerosis  DOPPLER: Transaortic velocity was within the normal range  There was no evidence for stenosis  There was no  regurgitation  TRICUSPID VALVE: The valve structure was normal  There was normal leaflet separation  DOPPLER: The transtricuspid velocity was within the normal range  There was no evidence for stenosis  There was mild regurgitation  Estimated peak PA  pressure was 45 mmHg  The findings suggest mild pulmonary hypertension  PULMONIC VALVE: Leaflets exhibited normal thickness, no calcification, and normal cuspal separation  DOPPLER: The transpulmonic velocity was within the normal range  There was trace regurgitation  PERICARDIUM: There was no pericardial effusion   The pericardium was normal in appearance  AORTA: The root exhibited normal size  SYSTEMIC VEINS: IVC: The inferior vena cava was normal in size and course  The inferior vena cava was mildly dilated  The respirophasic change in diameter was less than 50%      SYSTEM MEASUREMENT TABLES    2D  %FS: 30 77 %  Ao Diam: 2 39 cm  EDV(Teich): 154 23 ml  EF(Teich): 57 71 %  ESV(Teich): 65 23 ml  IVSd: 0 87 cm  LA Area: 14 86 cm2  LA Diam: 3 53 cm  LVEDV MOD A4C: 92 56 ml  LVEF MOD A4C: 47 %  LVESV MOD A4C: 49 06 ml  LVIDd: 5 61 cm  LVIDs: 3 88 cm  LVLd A4C: 7 45 cm  LVLs A4C: 6 22 cm  LVPWd: 0 87 cm  RA Area: 10 9 cm2  RVIDd: 2 72 cm  SV MOD A4C: 43 5 ml  SV(Teich): 89 01 ml    CW  TR Vmax: 2 78 m/s  TR maxP 98 mmHg    MM  TAPSE: 2 02 cm    PW  E' Sept: 0 03 m/s  E/E' Sept: 27 02  MV A [place]: 0 54 m/s  MV Dec [place]: 2 56 m/s2  MV DecT: 295 03 ms  MV E [place]: 0 76 m/s  MV E/A Ratio: 1 41  MV PHT: 85 56 ms  MVA By PHT: 2 57 cm2    [employer] Accredited Echocardiography Laboratory    Prepared and electronically signed by    Yris Thomas MD  Signed 17-May-2021 11:19:55         EKG/telemetry:  Normal sinus rhythm without ventricular arrhythmias    VTE Pharmacologic Prophylaxis: Heparin subQ

## 2021-05-20 NOTE — ASSESSMENT & PLAN NOTE
Wt Readings from Last 3 Encounters:   05/20/21 66 1 kg (145 lb 12 8 oz)     · Ischemic cardiomyopathy w/ LVEF 45% in 2020, now down to 25% suspected to be 2/2 myocardial stunning  · Daily weights  · Currently euvolemic  · Resume both Entresto and spironolactone on discharge

## 2021-05-20 NOTE — PROGRESS NOTES
Cardiology Progress Note - Rehana Mace 80 y o  female MRN: [id number]    Unit/Bed#: -01 Encounter: [id number]      Assessment:  Principal Problem:    Ventricular tachycardia (HCC)  Active Problems:    Chronic systolic heart failure (HCC)    Hyperlipidemia    CAD (coronary artery disease)    Type 2 diabetes mellitus ([facility] [address] )    [facility] ([facility]) ([facility] )    Scalp laceration    Acute respiratory failure with hypoxia ([facility] )      Plan:  Patient is comfortable  She has no chest pain or significant dyspnea  She is in sinus rhythm on telemetry with a controlled rate  Electrophysiology note appreciated  Likely okay for discharge planning  Patient will continue to follow at [facility] with her electrophysiologist Dr Carolina Pedroza  Would continue present medical regimen BMP today with potassium of 4 0 and creatinine of 1 1  Subjective:   Patient seen and examined  No significant events overnight   negative  Objective:     Vitals: Blood pressure 124/66, pulse 63, temperature 98 2 °F (36 8 °C), resp  rate 19, height 5' 7" (1 702 m), weight 66 1 kg (145 lb 12 8 oz), SpO2 95 %  , Body mass index is 22 84 kg/m² ,   Orthostatic Blood Pressures      Most Recent Value   Blood Pressure  124/66 filed at 05/20/2021 1581   Patient Position - Orthostatic VS  Sitting filed at 05/16/2021 2045      ,      Intake/Output Summary (Last 24 hours) at 5/20/2021 0848  Last data filed at 5/19/2021 1826  Gross per 24 hour   Intake 906 52 ml   Output 400 ml   Net 506 52 ml       No significant arrhythmias seen on telemetry review         Physical Exam:    GEN: Rehana Mace appears well, alert and oriented x 3, pleasant and cooperative   NECK: supple, no carotid bruits, no JVD or HJR  HEART: normal rate, regular rhythm, normal S1 and S2, no murmurs, clicks, gallops or rubs   LUNGS: clear to auscultation bilaterally; no wheezes, rales, or rhonchi   ABDOMEN: normal bowel sounds, soft, no tenderness, no distention  EXTREMITIES: peripheral pulses normal; no clubbing, cyanosis, or edema  SKIN: warm and well perfused, no suspicious lesions on exposed skin    Labs & Results:    No results displayed because visit has over 200 results  Xr Chest Portable    Result Date: 5/19/2021  Narrative: CHEST INDICATION:   sob  COMPARISON:  05/16/2021 EXAM PERFORMED/VIEWS:  XR CHEST PORTABLE 1 image FINDINGS: Cardiomediastinal silhouette appears unremarkable  A defibrillator enters on the left  No definite infiltrates  Mild vascular congestion is suggested  No pneumothorax or pleural effusion  Osseous structures appear within normal limits for patient age  Impression: Mild vascular congestion is suggested  Correlate for mild CHF  Workstation performed: [id number]     Xr Chest 1 View Portable    Result Date: 5/17/2021  Narrative: CHEST INDICATION:   code  Cardiac arrest COMPARISON:  None EXAM PERFORMED/VIEWS:  XR CHEST PORTABLE Images: 2 FINDINGS: Cardiomediastinal silhouette appears unremarkable  An endotracheal tube lies in satisfactory position  An enteric tube extends into the stomach  A defibrillator enters on the left  A right IJ line is present with its tip at the junction of the SVC and right atrium  External monitoring devices are present  The lungs are clear  No pneumothorax or pleural effusion  Suspected skin folds overlying the lateral right lung field  Osseous structures appear within normal limits for patient age  Impression: No acute cardiopulmonary disease  Workstation performed: [id number]     Ct Head Wo Contrast    Result Date: 5/18/2021  Narrative: CT BRAIN - WITHOUT CONTRAST INDICATION:   Cerebral hemorrhage suspected f/u possible falx hemorrhage  COMPARISON:  Head CTs 5/16/2021 TECHNIQUE:  CT examination of the brain was performed  In addition to axial images, sagittal and coronal 2D reformatted images were created and submitted for interpretation   Radiation dose length product (DLP) for this visit:  877 97 mGy-cm   This examination, like all CT scans performed in the [facility], was performed utilizing techniques to minimize radiation dose exposure, including the use of iterative  reconstruction and automated exposure control  IMAGE QUALITY:  Diagnostic  FINDINGS: PARENCHYMA:  No intracranial masslike lesion, mass effect or midline shift  No CT signs of acute infarction  No acute parenchymal hemorrhage  VENTRICLES AND EXTRA-AXIAL SPACES:  Normal for the patient's age  Tiny interhemispheric anterior falcine linear hyperdensity is stable measuring 5 mm craniocaudally (series 400 image 32)  VISUALIZED ORBITS AND PARANASAL SINUSES:  Unremarkable  CALVARIUM AND EXTRACRANIAL SOFT TISSUES:  Normal      Impression: Stable size and configuration of 5 mm anterior interhemispheric falcine hyperdensity  Stability suggests calcification over hemorrhage  Workstation performed: [id number]     Ct Head Wo Contrast    Result Date: 5/16/2021  Narrative: CT BRAIN - WITHOUT CONTRAST INDICATION:  Code  COMPARISON:  CT head earlier today TECHNIQUE:  CT examination of the brain was performed  In addition to axial images, sagittal and coronal 2D reformatted images were created and submitted for interpretation  Radiation dose length product (DLP) for this visit:  674 39 mGy-cm  This examination, like all CT scans performed in the [facility], was performed utilizing techniques to minimize radiation dose exposure, including the use of iterative reconstruction and automated exposure control  IMAGE QUALITY:  Diagnostic  FINDINGS: PARENCHYMA:  No intracranial mass, mass effect or midline shift  No CT signs of acute infarction  No acute parenchymal hemorrhage  VENTRICLES AND EXTRA-AXIAL SPACES:  There is a 5 mm linear hyperdensity seen along the anterior interhemispheric falx (series 2/21)  Unclear if this is related to calcification of the interhemispheric falx, tiny subdural bleed or a calcified vessel  This is probably unchanged from the recent CT study in retrospect  Otherwise, no evidence of extra-axial fluid collections  The ventricles are normal size, midline in position  VISUALIZED ORBITS AND PARANASAL SINUSES:  Unremarkable  CALVARIUM AND EXTRACRANIAL SOFT TISSUES:  Mild right parietal scalp soft tissue swelling  No calvarial fracture  Impression: 5 mm linear hyperdensity along the anterior interhemispheric falx could represent calcification of the interhemispheric falx, tiny subdural bleed or a calcified vessel  This is unchanged  Follow-up CT head in 24 hours recommended  Otherwise, no acute intracranial abnormality  The study was marked in [facility] for immediate notification  Workstation performed: [id number]     Trauma - Ct Head Wo Contrast    Result Date: 5/16/2021  Narrative: CT BRAIN - WITHOUT CONTRAST INDICATION:   TRAUMA  COMPARISON:  None  TECHNIQUE:  CT examination of the brain was performed  In addition to axial images, sagittal and coronal 2D reformatted images were created and submitted for interpretation  Radiation dose length product (DLP) for this visit:  892 47 mGy-cm   This examination, like all CT scans performed in the [facility], was performed utilizing techniques to minimize radiation dose exposure, including the use of iterative  reconstruction and automated exposure control  IMAGE QUALITY:  Diagnostic  FINDINGS: PARENCHYMA:  No intracranial mass, mass effect or midline shift  No CT signs of acute infarction  No acute parenchymal hemorrhage  VENTRICLES AND EXTRA-AXIAL SPACES:  Normal for the patient's age  VISUALIZED ORBITS AND PARANASAL SINUSES:  Unremarkable  CALVARIUM AND EXTRACRANIAL SOFT TISSUES:  Normal      Impression: No acute intracranial abnormality   I personally discussed this study with trauma team at [phone] on 5/16/2021 at 5:57 PM  Workstation performed: [id number]     Trauma - Ct Spine Cervical Wo Contrast    Result Date: 5/16/2021  Narrative: CT CERVICAL SPINE - WITHOUT CONTRAST INDICATION:   TRAUMA  COMPARISON:  None  TECHNIQUE:  CT examination of the cervical spine was performed without intravenous contrast   Contiguous axial images were obtained  Sagittal and coronal reconstructions were performed  Radiation dose length product (DLP) for this visit:  386 6 mGy-cm   This examination, like all CT scans performed in the [facility], was performed utilizing techniques to minimize radiation dose exposure, including the use of iterative reconstruction and automated exposure control  IMAGE QUALITY:  Diagnostic  FINDINGS: ALIGNMENT:  Normal alignment of the cervical spine  No subluxation  VERTEBRAL BODIES:  No fracture  DEGENERATIVE CHANGES:  Degenerative disc changes with loss of disc and at C5-C6, C6-C7 with anterior osteophytes PREVERTEBRAL AND PARASPINAL SOFT TISSUES:  Unremarkable  THORACIC INLET:  Normal      Impression: No cervical spine fracture or traumatic malalignment  Degenerative changes from C5 to C7  I personally discussed this study with trauma team at [phone] on 5/16/2021 at 5:57 PM  Workstation performed: [id number]     Xr Chest 1 View    Result Date: 5/17/2021  Narrative: TRAUMA SERIES INDICATION:  Trauma level B, fall  COMPARISON:  None VIEWS:  XR TRAUMA MULTIPLE  FINDINGS: CHEST: Supine frontal view of the chest is obtained  ICD transvenous pacemaker tip in right ventricle  Lungs are clear  No layering pleural effusions detected  No pneumothorax is seen on this supine film  Upright images are more sensitive to detect anterior pneumothoraces if relevant  No displaced fractures  Impression: No acute pulmonary disease Workstation performed: [id number]     Xr Trauma Multiple (slb/slra Trauma [place] Only)    Result Date: 5/17/2021  Narrative: TRAUMA SERIES INDICATION:  Trauma level B, fall   COMPARISON:  None VIEWS:  XR TRAUMA MULTIPLE  FINDINGS: CHEST: Supine frontal view of the chest is obtained  ICD transvenous pacemaker tip in right ventricle  Lungs are clear  No layering pleural effusions detected  No pneumothorax is seen on this supine film  Upright images are more sensitive to detect anterior pneumothoraces if relevant  No displaced fractures  Impression: No acute pulmonary disease Workstation performed: [id number]     Us Bedside Procedure    Result Date: 5/16/2021  Narrative: 1 2 840 [id number]  3 [id number]  0 [id number]  1417      EKG personally reviewed by Heide Castellano MD      Counseling / Coordination of Care  Total floor / unit time spent today 30 minutes  Greater than 50% of total time was spent with the patient and / or family counseling and / or coordination of care

## 2021-05-20 NOTE — DISCHARGE SUMMARY
[address] [facility]  Discharge- [address] 1950, 80 y o  female MRN: [id number]  Unit/Bed#: -Lolly Encounter: [id number]  Primary Care Provider: No primary care provider on file  Date and time admitted to hospital: 5/16/2021  5:32 PM    * Ventricular tachycardia [facility])  Assessment & Plan  · Presented to the ER on 5/17, found to be in VT storm  · Shocked 42 times, received 150 amio x 2, 200mg lidocaine, started on lidocaine and amiodarone infusions  Esmolol added  · Intubated for airway protection  · Weaned off lidocaine, esmolol  · Extubated 5/17 successfully  · Continue mexiletine, lopressor, amiodarone PO  · Appreciate HF recommendations  · Closed with electrophysiology, discharge patient on metoprolol 25 mg twice a day, amiodarone 200 mg twice a day, mexiletine 150 mg t i d    · Patient to follow-up with outpatient Cardiology Dr Gauri Saldaña with [facility], patient is most likely going to need VT ablation, scar modification    Acute respiratory failure with hypoxia (HCC)-resolved as of 5/20/2021  Assessment & Plan  · Received 20mg lasix x 1 with good response  · Currently in room air    Scalp laceration  Assessment & Plan  · S/p repair by trauma team  · Suture removal 5/27    Type 2 diabetes mellitus [facility])  Assessment & Plan  No results found for: HGBA1C    Recent Labs     05/19/21  1125 05/19/21  1631 05/19/21  2115 05/20/21  0622   POCGLU 149* 105 120 133       Blood Sugar Average: Last 72 hrs:  (P) 158 [id number]     Resume home medication on discharge    CAD (coronary artery disease)  Assessment & Plan  · CAD s/p JESSIKA   · ASA/Brillinta  · Statin    Chronic systolic heart failure ([address] )  Assessment & Plan  Wt Readings from Last 3 Encounters:   05/20/21 66 1 kg (145 lb 12 8 oz)     · Ischemic cardiomyopathy w/ LVEF 45% in 2020, now down to 25% suspected to be 2/2 myocardial stunning  · Daily weights  · Currently euvolemic  · Resume both Entresto and spironolactone on discharge      YOLIS (acute kidney injury) (HCC)-resolved as of 5/20/2021  Assessment & Plan  · Improved, trend I/Os         Discharging Physician / Practitioner: Per Maciel MD  PCP: No primary care provider on file  Admission Date:   Admission Orders (From admission, onward)     Ordered        05/16/21 2156  Inpatient Admission  Once                   Discharge Date: 05/20/21    Resolved Problems  Date Reviewed: 5/20/2021          Resolved    Hyperkalemia 5/18/2021     Resolved by  Galindo Rubin PA-C    YOLIS (acute kidney injury) ([address] ) 5/20/2021     Resolved by  Per Maciel MD    Metabolic acidosis 7/07/7139     Resolved by  Galindo Rubin PA-C          Consultations During Hospital Stay:  · Cardiology  · Electrophysiology  · Critical care  · PT and OT    Procedures Performed:   · Intubation 05/16/2021, extubation 05/17/2021  ·     Significant Findings / Test Results:   · VTach on admission  Echo:    LEFT VENTRICLE:  The ventricle was mildly dilated  Systolic function was markedly reduced  Ejection fraction was estimated to be 25 %  There was moderate diffuse hypokinesis  There was severe hypokinesis of the mid-apical anterior, mid anteroseptal, and apical wall(s)  The changes were consistent with concentric remodeling (increased wall thickness with normal wall mass)  Features were consistent with a pseudonormal left ventricular filling pattern, with concomitant abnormal relaxation and increased filling pressure (grade 2 diastolic dysfunction)  Doppler parameters were consistent with elevated mean left atrial filling pressure      RIGHT VENTRICLE:  The size was normal   Systolic function was normal      MITRAL VALVE:  There was mild to moderate annular calcification  There was mild regurgitation      AORTIC VALVE:  There was no evidence for stenosis      TRICUSPID VALVE:  There was mild regurgitation      IVC, HEPATIC VEINS:  The inferior vena cava was mildly dilated    The respirophasic change in diameter was less than 50%  ·      Incidental Findings:   · none     Test Results Pending at Discharge (will require follow up):   · none     Outpatient Tests Requested:  · Follow up with Cardiology Dr Allyn Kelly    Complications:  Lakisha Vaughn    Reason for Admission: syncope, [facility] Course:     Chris Chen is a 80 y o  female patient who originally presented to the hospital on 5/16/2021 with history of CAD status post DESx3 2019, previous cardiac arrest in January 2020 status post ICD placement, CHF, CKD, presented to ER with syncope  In the ER she was found to have V-tach  She was cardioverted in EMS and in the ER requiring total of 42 shocks for 3 separate episode of sustained V-tach  Patient was intubated and transferred to ICU  Patient initially was treated with amiodarone, esmolol and lidocaine infusion  A thorough device interrogation was performed in setting has been adjusted  Electrophysiology and Cardiology has been consulted  Patient was successfully extubated on 05/17/2021  Patient's medication had been adjusted, and mexiletine has been started by electrophysiology  Amiodarone dose and metoprolol does also has been adjusted  Patient recovered very rapidly  With the new medication doses, patient was observed on telemetry monitor for 24 hours  Patient continued to improve and did well  Patient participated with physical therapy occupational therapy, did very well, no need before discharge  Please see above list of diagnoses and related plan for additional information  Condition at Discharge: good     Discharge Day Visit / Exam:     Subjective:  Patient examined at bedside  Patient is sitting up on the sofa eating lunch  Patient states she feels very well  She walked on the hallway without any difficulty  Participated with therapy early morning  She states she would really prefer to go home as she did not get any good night's sleep     is very supportive and will be there at home with her  Vitals: Blood Pressure: 115/53 (05/20/21 1218)  Pulse: 57 (05/20/21 1218)  Temperature: 97 9 °F (36 6 °C) (05/20/21 1218)  Temp Source: Oral (05/19/21 0734)  Respirations: 19 (05/20/21 1218)  Height: 5' 7" (170 2 cm) (05/16/21 2055)  Weight - Scale: 66 1 kg (145 lb 12 8 oz) (05/20/21 0459)  SpO2: 97 % (05/20/21 1218)  Exam:   Physical Exam  Constitutional:       Appearance: She is well-developed  HENT:      Head: Normocephalic and atraumatic  Neck:      Musculoskeletal: Normal range of motion  Pulmonary:      Effort: Pulmonary effort is normal  No respiratory distress  Breath sounds: Normal breath sounds  Skin:     General: Skin is warm and dry  Discussion with Family:   at bedside    Discharge instructions/Information to patient and family:   See after visit summary for information provided to patient and family  Provisions for Follow-Up Care:  See after visit summary for information related to follow-up care and any pertinent home health orders  Disposition:     Home    ·     Planned Readmission: no     Discharge Statement:  I spent 45 minutes discharging the patient  This time was spent on the day of discharge  I had direct contact with the patient on the day of discharge  Greater than 50% of the total time was spent examining patient, answering all patient questions, arranging and discussing plan of care with patient as well as directly providing post-discharge instructions  Additional time then spent on discharge activities  Discharge Medications:  See after visit summary for reconciled discharge medications provided to patient and family        ** Please Note: This note has been constructed using a voice recognition system **

## 2021-05-20 NOTE — DISCHARGE INSTRUCTIONS
Please note the following medication recommendations:  - increase amiodarone to 200 mg twice daily  - increase metoprolol to 25 mg twice daily  - begin mexiletine 150 mg 3 times daily    Please call Dr Jill Carl in the near future to discuss ongoing cardiac management

## 2021-05-20 NOTE — PLAN OF CARE
Problem: Potential for Falls  Goal: Patient will remain free of falls  Description: INTERVENTIONS:  - Assess patient frequently for physical needs  -  Identify cognitive and physical deficits and behaviors that affect risk of falls  -  [place] fall precautions as indicated by assessment   - Educate patient/family on patient safety including physical limitations  - Instruct patient to call for assistance with activity based on assessment  - Modify environment to reduce risk of injury  - Consider OT/PT consult to assist with strengthening/mobility  Outcome: Progressing     Problem: Prexisting or High Potential for Compromised Skin Integrity  Goal: Skin integrity is maintained or improved  Description: INTERVENTIONS:  - Identify patients at risk for skin breakdown  - Assess and monitor skin integrity  - Assess and monitor nutrition and hydration status  - Monitor labs   - Assess for incontinence   - Turn and reposition patient  - Assist with mobility/ambulation  - Relieve pressure over bony prominences  - Avoid friction and shearing  - Provide appropriate hygiene as needed including keeping skin clean and dry  - Evaluate need for skin moisturizer/barrier cream  - Collaborate with interdisciplinary team   - Patient/family teaching  - Consider wound care consult   Outcome: Progressing     Problem: Nutrition/Hydration-ADULT  Goal: Nutrient/Hydration intake appropriate for improving, restoring or maintaining nutritional needs  Description: Monitor and assess patient's nutrition/hydration status for malnutrition  Collaborate with interdisciplinary team and initiate plan and interventions as ordered  Monitor patient's weight and dietary intake as ordered or per policy  Utilize nutrition screening tool and intervene as necessary  Determine patient's food preferences and provide high-protein, high-caloric foods as appropriate       INTERVENTIONS:  - Monitor oral intake, urinary output, labs, and treatment plans  - Assess nutrition and hydration status and recommend course of action  - Evaluate amount of meals eaten  - Assist patient with eating if necessary   - Allow adequate time for meals  - Recommend/ encourage appropriate diets, oral nutritional supplements, and vitamin/mineral supplements  - Order, calculate, and assess calorie counts as needed  - Recommend, monitor, and adjust tube feedings and TPN/PPN based on assessed needs  - Assess need for intravenous fluids  - Provide specific nutrition/hydration education as appropriate  - Include patient/family/caregiver in decisions related to nutrition  Outcome: Progressing     Problem: SAFETY,RESTRAINT: NV/NON-SELF DESTRUCTIVE BEHAVIOR  Goal: Remains free of harm/injury (restraint for non violent/non self-detsructive behavior)  Description: INTERVENTIONS:  - Instruct patient/family regarding restraint use   - Assess and monitor physiologic and psychological status   - Provide interventions and comfort measures to meet assessed patient needs   - Identify and implement measures to help patient regain control  - Assess readiness for release of restraint   Outcome: Progressing  Goal: Returns to optimal restraint-free functioning  Description: INTERVENTIONS:  - Assess the patient's behavior and symptoms that indicate continued need for restraint  - Identify and implement measures to help patient regain control  - Assess readiness for release of restraint   Outcome: Progressing     Problem: PAIN - ADULT  Goal: Verbalizes/displays adequate comfort level or baseline comfort level  Description: Interventions:  - Encourage patient to monitor pain and request assistance  - Assess pain using appropriate pain scale  - Administer analgesics based on type and severity of pain and evaluate response  - Implement non-pharmacological measures as appropriate and evaluate response  - Consider cultural and social influences on pain and pain management  - Notify physician/advanced practitioner if interventions unsuccessful or patient reports new pain  Outcome: Progressing     Problem: INFECTION - ADULT  Goal: Absence or prevention of progression during hospitalization  Description: INTERVENTIONS:  - Assess and monitor for signs and symptoms of infection  - Monitor lab/diagnostic results  - Monitor all insertion sites, i e  indwelling lines, tubes, and drains  - Monitor endotracheal if appropriate and nasal secretions for changes in amount and color  - [place] appropriate cooling/warming therapies per order  - Administer medications as ordered  - Instruct and encourage patient and family to use good hand hygiene technique  - Identify and instruct in appropriate isolation precautions for identified infection/condition  Outcome: Progressing  Goal: Absence of fever/infection during neutropenic period  Description: INTERVENTIONS:  - Monitor WBC    Outcome: Progressing     Problem: SAFETY ADULT  Goal: Patient will remain free of falls  Description: INTERVENTIONS:  - Assess patient frequently for physical needs  -  Identify cognitive and physical deficits and behaviors that affect risk of falls    -  [place] fall precautions as indicated by assessment   - Educate patient/family on patient safety including physical limitations  - Instruct patient to call for assistance with activity based on assessment  - Modify environment to reduce risk of injury  - Consider OT/PT consult to assist with strengthening/mobility  Outcome: Progressing  Goal: Maintain or return to baseline ADL function  Description: INTERVENTIONS:  -  Assess patient's ability to carry out ADLs; assess patient's baseline for ADL function and identify physical deficits which impact ability to perform ADLs (bathing, care of mouth/teeth, toileting, grooming, dressing, etc )  - Assess/evaluate cause of self-care deficits   - Assess range of motion  - Assess patient's mobility; develop plan if impaired  - Assess patient's need for assistive devices and provide as appropriate  - Encourage maximum independence but intervene and supervise when necessary  - Involve family in performance of ADLs  - Assess for home care needs following discharge   - Consider OT consult to assist with ADL evaluation and planning for discharge  - Provide patient education as appropriate  Outcome: Progressing  Goal: Maintain or return mobility status to optimal level  Description: INTERVENTIONS:  - Assess patient's baseline mobility status (ambulation, transfers, stairs, etc )    - Identify cognitive and physical deficits and behaviors that affect mobility  - Identify mobility aids required to assist with transfers and/or ambulation (gait belt, sit-to-stand, lift, walker, cane, etc )  - [place] fall precautions as indicated by assessment  - Record patient progress and toleration of activity level on Mobility SBAR; progress patient to next Phase/Stage  - Instruct patient to call for assistance with activity based on assessment  - Consider rehabilitation consult to assist with strengthening/weightbearing, etc   Outcome: Progressing     Problem: DISCHARGE PLANNING  Goal: Discharge to home or other facility with appropriate resources  Description: INTERVENTIONS:  - Identify barriers to discharge w/patient and caregiver  - Arrange for needed discharge resources and transportation as appropriate  - Identify discharge learning needs (meds, wound care, etc )  - Arrange for interpretive services to assist at discharge as needed  - Refer to Case Management Department for coordinating discharge planning if the patient needs post-hospital services based on physician/advanced practitioner order or complex needs related to functional status, cognitive ability, or social support system  Outcome: Progressing     Problem: Knowledge Deficit  Goal: Patient/family/caregiver demonstrates understanding of disease process, treatment plan, medications, and discharge instructions  Description: Complete learning assessment and assess knowledge base    Interventions:  - Provide teaching at level of understanding  - Provide teaching via preferred learning methods  Outcome: Progressing     Problem: CARDIOVASCULAR - ADULT  Goal: Maintains optimal cardiac output and hemodynamic stability  Description: INTERVENTIONS:  - Monitor I/O, vital signs and rhythm  - Monitor for S/S and trends of decreased cardiac output  - Administer and titrate ordered vasoactive medications to optimize hemodynamic stability  - Assess quality of pulses, skin color and temperature  - Assess for signs of decreased coronary artery perfusion  - Instruct patient to report change in severity of symptoms  Outcome: Progressing  Goal: Absence of cardiac dysrhythmias or at baseline rhythm  Description: INTERVENTIONS:  - Continuous cardiac monitoring, vital signs, obtain 12 lead EKG if ordered  - Administer antiarrhythmic and heart rate control medications as ordered  - Monitor electrolytes and administer replacement therapy as ordered  Outcome: Progressing     Problem: RESPIRATORY - ADULT  Goal: Achieves optimal ventilation and oxygenation  Description: INTERVENTIONS:  - Assess for changes in respiratory status  - Assess for changes in mentation and behavior  - Position to facilitate oxygenation and minimize respiratory effort  - Oxygen administered by appropriate delivery if ordered  - Initiate smoking cessation education as indicated  - Encourage broncho-pulmonary hygiene including cough, deep breathe, Incentive Spirometry  - Assess the need for suctioning and aspirate as needed  - Assess and instruct to report SOB or any respiratory difficulty  - Respiratory Therapy support as indicated  Outcome: Progressing

## 2021-05-20 NOTE — CASE MANAGEMENT
S/w [facility] for care coordination  Pt cleared to go home & has no cm needs  States she s/w therapy & pt can go home  Asked to f/u med sent to [facility]  Cost is $33 47  [facility] s/w pt & she is agreeable  Med will be at [facility]